# Patient Record
Sex: FEMALE | Race: WHITE | Employment: OTHER | URBAN - METROPOLITAN AREA
[De-identification: names, ages, dates, MRNs, and addresses within clinical notes are randomized per-mention and may not be internally consistent; named-entity substitution may affect disease eponyms.]

---

## 2017-05-27 ENCOUNTER — HOSPITAL ENCOUNTER (OUTPATIENT)
Dept: MAMMOGRAPHY | Age: 67
Discharge: HOME | End: 2017-05-27
Attending: FAMILY MEDICINE | Admitting: FAMILY MEDICINE

## 2017-05-27 DIAGNOSIS — Z12.31 ENCOUNTER FOR SCREENING MAMMOGRAM FOR BREAST CANCER: ICD-10-CM

## 2017-09-29 ENCOUNTER — HOSPITAL ENCOUNTER (OUTPATIENT)
Dept: OTHER | Age: 67
Discharge: HOME | End: 2017-09-29
Attending: FAMILY MEDICINE | Admitting: FAMILY MEDICINE

## 2017-09-29 DIAGNOSIS — M79.672 LEFT FOOT PAIN: ICD-10-CM

## 2017-09-29 DIAGNOSIS — M79.671 RIGHT FOOT PAIN: ICD-10-CM

## 2017-10-06 ENCOUNTER — PROCEDURE VISIT (OUTPATIENT)
Dept: NEUROLOGY | Age: 67
End: 2017-10-06

## 2017-10-06 DIAGNOSIS — R20.2 NUMBNESS AND TINGLING: Primary | ICD-10-CM

## 2017-10-06 DIAGNOSIS — R20.0 NUMBNESS AND TINGLING: Primary | ICD-10-CM

## 2017-10-06 PROCEDURE — 95886 MUSC TEST DONE W/N TEST COMP: CPT | Performed by: PSYCHIATRY & NEUROLOGY

## 2017-10-06 PROCEDURE — 95909 NRV CNDJ TST 5-6 STUDIES: CPT | Performed by: PSYCHIATRY & NEUROLOGY

## 2017-10-06 NOTE — PROGRESS NOTES
Emily Bush M.D. [facility]) Physicians/[facility]  Board Certified in [address] [facility], [address] [facility], [address]    EMG / NERVE CONDUCTION STUDY    PATIENT:     Shashi Mendez      DATE OF EMG:    10/6/2017    YOB: 1950       REASON FOR EMG: Numbness and tingling in both legs       REFERRING PHYSICIAN:  Daryel Snellen, MD  [address], [address]    SUMMARY:  Bilateral peroneal motor nerve studies were normal.  Bilateral posterior tibial motor nerve studies were normal.  Bilateral sural sensory nerve studies were normal.  Needle EMG of several muscles in both lower extremities was normal.  Needle EMG of lumbar paraspinal muscles was normal.     CLINICAL DIAGNOSIS:  Peripheral neuropathy versus lumbar radiculopathy     EMG RESULTS:   This is a normal EMG and nerve conduction study of both lower extremities. There is no electrophysiological evidence for large fiber neuropathy or lumbar radiculopathy at this time. _____________________________  Emily Bush M.D.   Electromyographer/Neurologist

## 2017-10-16 ENCOUNTER — HOSPITAL ENCOUNTER (OUTPATIENT)
Dept: OTHER | Age: 67
Discharge: HOME | End: 2017-10-16
Attending: FAMILY MEDICINE | Admitting: FAMILY MEDICINE

## 2017-10-16 DIAGNOSIS — R20.2 PARESTHESIA: ICD-10-CM

## 2018-05-31 VITALS
WEIGHT: 164 LBS | HEIGHT: 61 IN | SYSTOLIC BLOOD PRESSURE: 130 MMHG | RESPIRATION RATE: 12 BRPM | DIASTOLIC BLOOD PRESSURE: 60 MMHG | BODY MASS INDEX: 30.96 KG/M2 | HEART RATE: 57 BPM | TEMPERATURE: 97.7 F

## 2020-11-23 ENCOUNTER — OFFICE VISIT (OUTPATIENT)
Dept: FAMILY MEDICINE CLINIC | Age: 70
End: 2020-11-23
Payer: MEDICARE

## 2020-11-23 VITALS
OXYGEN SATURATION: 98 % | WEIGHT: 181 LBS | TEMPERATURE: 97.8 F | BODY MASS INDEX: 34.17 KG/M2 | DIASTOLIC BLOOD PRESSURE: 70 MMHG | HEIGHT: 61 IN | HEART RATE: 78 BPM | SYSTOLIC BLOOD PRESSURE: 132 MMHG

## 2020-11-23 LAB
BASOPHILS ABSOLUTE: 0 K/UL (ref 0–0.2)
BASOPHILS RELATIVE PERCENT: 0.6 %
EOSINOPHILS ABSOLUTE: 0.1 K/UL (ref 0–0.6)
EOSINOPHILS RELATIVE PERCENT: 1.5 %
HCT VFR BLD CALC: 41.2 % (ref 36–48)
HEMOGLOBIN: 13.2 G/DL (ref 12–16)
HEPATITIS C ANTIBODY INTERPRETATION: NORMAL
LYMPHOCYTES ABSOLUTE: 2 K/UL (ref 1–5.1)
LYMPHOCYTES RELATIVE PERCENT: 30.7 %
MCH RBC QN AUTO: 26.1 PG (ref 26–34)
MCHC RBC AUTO-ENTMCNC: 31.9 G/DL (ref 31–36)
MCV RBC AUTO: 81.8 FL (ref 80–100)
MONOCYTES ABSOLUTE: 0.5 K/UL (ref 0–1.3)
MONOCYTES RELATIVE PERCENT: 7 %
NEUTROPHILS ABSOLUTE: 4 K/UL (ref 1.7–7.7)
NEUTROPHILS RELATIVE PERCENT: 60.2 %
PDW BLD-RTO: 15.3 % (ref 12.4–15.4)
PLATELET # BLD: 278 K/UL (ref 135–450)
PMV BLD AUTO: 9 FL (ref 5–10.5)
RBC # BLD: 5.04 M/UL (ref 4–5.2)
WBC # BLD: 6.6 K/UL (ref 4–11)

## 2020-11-23 PROCEDURE — 1090F PRES/ABSN URINE INCON ASSESS: CPT | Performed by: FAMILY MEDICINE

## 2020-11-23 PROCEDURE — G8399 PT W/DXA RESULTS DOCUMENT: HCPCS | Performed by: FAMILY MEDICINE

## 2020-11-23 PROCEDURE — 4040F PNEUMOC VAC/ADMIN/RCVD: CPT | Performed by: FAMILY MEDICINE

## 2020-11-23 PROCEDURE — G0008 ADMIN INFLUENZA VIRUS VAC: HCPCS | Performed by: FAMILY MEDICINE

## 2020-11-23 PROCEDURE — 3017F COLORECTAL CA SCREEN DOC REV: CPT | Performed by: FAMILY MEDICINE

## 2020-11-23 PROCEDURE — G8484 FLU IMMUNIZE NO ADMIN: HCPCS | Performed by: FAMILY MEDICINE

## 2020-11-23 PROCEDURE — 99204 OFFICE O/P NEW MOD 45 MIN: CPT | Performed by: FAMILY MEDICINE

## 2020-11-23 PROCEDURE — G8417 CALC BMI ABV UP PARAM F/U: HCPCS | Performed by: FAMILY MEDICINE

## 2020-11-23 PROCEDURE — 1123F ACP DISCUSS/DSCN MKR DOCD: CPT | Performed by: FAMILY MEDICINE

## 2020-11-23 PROCEDURE — G8427 DOCREV CUR MEDS BY ELIG CLIN: HCPCS | Performed by: FAMILY MEDICINE

## 2020-11-23 PROCEDURE — 1036F TOBACCO NON-USER: CPT | Performed by: FAMILY MEDICINE

## 2020-11-23 PROCEDURE — 90694 VACC AIIV4 NO PRSRV 0.5ML IM: CPT | Performed by: FAMILY MEDICINE

## 2020-11-23 ASSESSMENT — PATIENT HEALTH QUESTIONNAIRE - PHQ9
1. LITTLE INTEREST OR PLEASURE IN DOING THINGS: 0
SUM OF ALL RESPONSES TO PHQ QUESTIONS 1-9: 0
SUM OF ALL RESPONSES TO PHQ9 QUESTIONS 1 & 2: 0
2. FEELING DOWN, DEPRESSED OR HOPELESS: 0
SUM OF ALL RESPONSES TO PHQ QUESTIONS 1-9: 0
SUM OF ALL RESPONSES TO PHQ QUESTIONS 1-9: 0

## 2020-11-23 NOTE — PATIENT INSTRUCTIONS
Patient Education        Ankle: Exercises  Introduction  Here are some examples of exercises for you to try. The exercises may be suggested for a condition or for rehabilitation. Start each exercise slowly. Ease off the exercises if you start to have pain. You will be told when to start these exercises and which ones will work best for you. Alphabet exercise  \"Alphabet\" exercise   1. Trace the alphabet with your toe. This helps your ankle move in all directions. Side-to-side knee swing exercise   1. Sit in a chair with your foot flat on the floor. 2. Slowly move your knee from side to side while keeping your foot pressed flat. 3. Continue this exercise for 2 to 3 minutes. Towel curl   1. While sitting, place your foot on a towel on the floor and scrunch the towel toward you with your toes. 2. Then use your toes to push the towel away from you. 3. Make this exercise more challenging by placing a weighted object, such as a soup can, on the other end of the towel. Towel stretch   1. Sit with your legs extended and knees straight. 2. Place a towel around your foot just under the toes. 3. Hold each end of the towel in each hand, with your hands above your knees. 4. Pull back with the towel so that your foot stretches toward you. 5. Hold the position for at least 15 to 30 seconds. 6. Repeat 2 to 4 times a session, up to 5 sessions a day. Ankle eversion exercise   1. Start by sitting with your foot flat on the floor and pushing it outward against an immovable object such as the wall or heavy furniture. Hold for about 6 seconds, then relax. Repeat 8 to 12 times. 2. After you feel comfortable with this, try using rubber tubing looped around the outside of your feet for resistance. Push your foot out to the side against the tubing, and then count to 10 as you slowly bring your foot back to the middle. Repeat 8 to 12 times. Isometric opposition exercises   1.  While sitting, put your feet together flat on the floor. 2. Press your injured foot inward against your other foot. Hold for about 6 seconds, and relax. Repeat 8 to 12 times. 3. Then place the heel of your other foot on top of the injured one. Push down with the top heel while trying to push up with your injured foot. Hold for about 6 seconds, and relax. Repeat 8 to 12 times. Follow-up care is a key part of your treatment and safety. Be sure to make and go to all appointments, and call your doctor if you are having problems. It's also a good idea to know your test results and keep a list of the medicines you take. Where can you learn more? Go to [email or web address] and sign in to your [employer] account. Enter F178 in the [facility] box to learn more about \"Ankle: Exercises. \"     If you do not have an account, please click on the \"Sign Up Now\" link. Current as of: March 2, 2020               Content Version: 12.6  © 2006-2020 [employer]. Care instructions adapted under license by [facility] ([facility]). If you have questions about a medical condition or this instruction, always ask your healthcare professional. [place] [address] any warranty or liability for your use of this information.

## 2020-11-24 LAB
A/G RATIO: 1.4 (ref 1.1–2.2)
ALBUMIN SERPL-MCNC: 4.3 G/DL (ref 3.4–5)
ALP BLD-CCNC: 118 U/L (ref 40–129)
ALT SERPL-CCNC: 11 U/L (ref 10–40)
ANION GAP SERPL CALCULATED.3IONS-SCNC: 11 MMOL/L (ref 3–16)
AST SERPL-CCNC: 16 U/L (ref 15–37)
BILIRUB SERPL-MCNC: 0.3 MG/DL (ref 0–1)
BUN BLDV-MCNC: 17 MG/DL (ref 7–20)
CALCIUM SERPL-MCNC: 9.6 MG/DL (ref 8.3–10.6)
CHLORIDE BLD-SCNC: 104 MMOL/L (ref 99–110)
CHOLESTEROL, TOTAL: 209 MG/DL (ref 0–199)
CO2: 26 MMOL/L (ref 21–32)
CREAT SERPL-MCNC: 0.9 MG/DL (ref 0.6–1.2)
ESTIMATED AVERAGE GLUCOSE: 119.8 MG/DL
GFR AFRICAN AMERICAN: >60
GFR NON-AFRICAN AMERICAN: >60
GLOBULIN: 3 G/DL
GLUCOSE BLD-MCNC: 93 MG/DL (ref 70–99)
HBA1C MFR BLD: 5.8 %
HDLC SERPL-MCNC: 45 MG/DL (ref 40–60)
LDL CHOLESTEROL CALCULATED: 117 MG/DL
POTASSIUM SERPL-SCNC: 4.5 MMOL/L (ref 3.5–5.1)
SODIUM BLD-SCNC: 141 MMOL/L (ref 136–145)
TOTAL PROTEIN: 7.3 G/DL (ref 6.4–8.2)
TRIGL SERPL-MCNC: 236 MG/DL (ref 0–150)
VLDLC SERPL CALC-MCNC: 47 MG/DL

## 2020-11-24 ASSESSMENT — ENCOUNTER SYMPTOMS
ABDOMINAL PAIN: 0
CHEST TIGHTNESS: 0
DIARRHEA: 0
BLOOD IN STOOL: 0
CONSTIPATION: 0
SHORTNESS OF BREATH: 0
COUGH: 0

## 2020-11-24 NOTE — PROGRESS NOTES
SUBJECTIVE:  Jami Peers   1950   female   No Known Allergies    Chief Complaint   Patient presents with    Established New Doctor    Ankle Pain     pain in ankles has had many fractures and sprains over the years         Patient Active Problem List    Diagnosis Date Noted    Basal cell carcinoma of left medial forehead 2016       HPI   New patient here with co GERD, hyperlipidemia, chronic bilateral ankle and left knee pain. Pt reports she has a hx of hyperlipidemia but no prior treatment and chol has not been checked for a while. She has been treating GED sx with OTC medications for over 5 years. Reports heartburn returns off medications. No sx on Omeprazole. Denies any change in diet or appetite . No bowel complaints. She has been having intermittent bilateral ankle pain with walking and sometimes first steps. No recent injuries but previous (mild ) fracture of left ankle and sprains of right ankle. She has also had intermittent pain and stiffness in left knee. No injuries. Denies swelling of knee, locking or giving way.        Past Medical History:   Diagnosis Date    GERD (gastroesophageal reflux disease)     Hyperlipidemia     Obesity      Social History     Socioeconomic History    Marital status: Single     Spouse name: Not on file    Number of children: Not on file    Years of education: Not on file    Highest education level: Not on file   Occupational History    Not on file   Social Needs    Financial resource strain: Not on file    Food insecurity     Worry: Not on file     Inability: Not on file    Transportation needs     Medical: Not on file     Non-medical: Not on file   Tobacco Use    Smoking status: Former Smoker     Packs/day: 1.00     Years: 20.00     Pack years: 20.00     Types: Cigarettes     Start date: 1980     Last attempt to quit: 1997     Years since quittin.9    Smokeless tobacco: Never Used   Substance and Sexual Activity    Alcohol use: No    Drug use: No    Sexual activity: Not on file   Lifestyle    Physical activity     Days per week: Not on file     Minutes per session: Not on file    Stress: Not on file   Relationships    Social connections     Talks on phone: Not on file     Gets together: Not on file     Attends Protestant service: Not on file     Active member of club or organization: Not on file     Attends meetings of clubs or organizations: Not on file     Relationship status: Not on file    Intimate partner violence     Fear of current or ex partner: Not on file     Emotionally abused: Not on file     Physically abused: Not on file     Forced sexual activity: Not on file   Other Topics Concern    Not on file   Social History Narrative    Not on file     Family History   Problem Relation Age of Onset    Breast Cancer Other     Heart Disease Other     Other Other         mi    Alzheimer's Disease Other        Review of Systems   Constitutional: Negative for activity change, appetite change, fever and unexpected weight change. Respiratory: Negative for cough, chest tightness and shortness of breath. Cardiovascular: Negative for chest pain, palpitations and leg swelling. Gastrointestinal: Negative for abdominal pain, blood in stool, constipation and diarrhea. Heartburn without Omeprazole   Musculoskeletal: Positive for arthralgias. Negative for myalgias. Bilateral ankle pain and knee pain /stiffness   Skin: Negative for rash. Neurological: Negative for light-headedness and headaches. Hematological: Negative for adenopathy. Does not bruise/bleed easily. Psychiatric/Behavioral: Negative for dysphoric mood and sleep disturbance. The patient is not nervous/anxious. OBJECTIVE:  /70   Pulse 78   Temp 97.8 °F (36.6 °C)   Ht 5' 1\" (1.549 m)   Wt 181 lb (82.1 kg)   SpO2 98%   BMI 34.20 kg/m²   Physical Exam  Vitals signs and nursing note reviewed.    Constitutional:       Appearance: She is well-developed. Eyes:      Pupils: Pupils are equal, round, and reactive to light. Neck:      Musculoskeletal: Normal range of motion and neck supple. Thyroid: No thyromegaly. Vascular: No JVD. Cardiovascular:      Rate and Rhythm: Normal rate and regular rhythm. Pulmonary:      Effort: Pulmonary effort is normal.      Breath sounds: Normal breath sounds. Abdominal:      General: Bowel sounds are normal.      Palpations: Abdomen is soft. Tenderness: There is no abdominal tenderness. Musculoskeletal:      Comments: Ankles- no swelling or redness. No point tenderness. Nl rom without pain    Left knee- no redness, effusion, nl rom with crepitus   Skin:     Findings: No rash. Neurological:      Mental Status: She is alert and oriented to person, place, and time. Cranial Nerves: No cranial nerve deficit. Psychiatric:         Behavior: Behavior normal.         Thought Content: Thought content normal.         ASSESSMENT/PLAN:    1. Chronic GERD  GERD precautions reviewed  Discussed pot long term risks of PPIs  - AFL - Diomedes Villasenor MD, Gastroenterology, [facility]    2. Hyperlipidemia, unspecified hyperlipidemia type  labs  appr diet mgt discussed  - Comprehensive Metabolic Panel; Future  - CBC Auto Differential; Future  - Hemoglobin A1C; Future  - Lipid Panel; Future  - HEPATITIS C ANTIBODY; Future  - Lipid Panel  - Hemoglobin A1C  - CBC Auto Differential  - Comprehensive Metabolic Panel  - HEPATITIS C ANTIBODY    3. Chronic pain of both ankles  rom stretches  Weight mgt  Voltaren gel  xrays    - XR ANKLE LEFT (2 VIEWS); Future  - XR ANKLE RIGHT (2 VIEWS); Future    4. Chronic pain of left knee  Voltaren gel  Strengthening exercises    5. Need for vaccination    - RI IMMUNIZ ADMIN,1 SINGLE/COMB VAC/TOXOID  - INFLUENZA, QUADV, ADJUVANTED, 65 YRS =, IM, PF, PREFILL SYR, 0.5ML (FLUAD)    6.  Encounter for screening mammogram for malignant neoplasm of breast    - [facility] DIGITAL SCREEN W OR WO CAD BILATERAL; Future    7. Colon cancer screening    - Cologuard (For External Results Only); Future      Orders Placed This Encounter   Procedures    Cologuard (For External Results Only)     This test is performed by an external laboratory and is used for result attachment only. It is required that this order requisition be faxed to: [facility] @ [phone]. See [email or web address] for further information.      Standing Status:   Future     Standing Expiration Date:   11/23/2021    STEFFEN DIGITAL SCREEN W OR WO CAD BILATERAL     Standing Status:   Future     Standing Expiration Date:   1/23/2022    XR ANKLE LEFT (2 VIEWS)     Standing Status:   Future     Standing Expiration Date:   11/23/2021    XR ANKLE RIGHT (2 VIEWS)     Standing Status:   Future     Standing Expiration Date:   11/23/2021    INFLUENZA, QUADV, ADJUVANTED, 72 YRS =, IM, PF, PREFILL SYR, 0.5ML (FLUAD)    Comprehensive Metabolic Panel     Standing Status:   Future     Number of Occurrences:   1     Standing Expiration Date:   12/13/2021    CBC Auto Differential     Standing Status:   Future     Number of Occurrences:   1     Standing Expiration Date:   12/13/2021    Hemoglobin A1C     Standing Status:   Future     Number of Occurrences:   1     Standing Expiration Date:   12/13/2021    Lipid Panel     Standing Status:   Future     Number of Occurrences:   1     Standing Expiration Date:   12/13/2021     Order Specific Question:   Is Patient Fasting?/# of Hours     Answer:   10 hrs    HEPATITIS C ANTIBODY     Standing Status:   Future     Number of Occurrences:   1     Standing Expiration Date:   11/23/2021    DOLLY - Coni Mina MD, Gastroenterology, [facility]     Referral Priority:   Routine     Referral Type:   Eval and Treat     Referral Reason:   Specialty Services Required     Referred to Provider:   Cheri Kmaara MD     Requested Specialty:   Gastroenterology     Number of Visits Requested:   1    MA IMMUNIZ ADMIN,1 SINGLE/COMB VAC/TOXOID     Current Outpatient Medications   Medication Sig Dispense Refill    ibuprofen (ADVIL;MOTRIN) 200 MG tablet Take 800 mg by mouth       Multiple Vitamins-Minerals (MULTIVITAMIN PO) Take 1 capsule by mouth      omeprazole (PRILOSEC) 20 MG capsule TAKE ONE CAPSULE BY MOUTH EVERY DAY       No current facility-administered medications for this visit. Return in about 4 weeks (around 12/21/2020), or if symptoms worsen or fail to improve, for hyperlipidmeia, ankle pain.     Paula Mariee MD

## 2020-12-08 ENCOUNTER — HOSPITAL ENCOUNTER (OUTPATIENT)
Age: 70
Discharge: HOME | End: 2020-12-08
Payer: MEDICARE

## 2020-12-08 ENCOUNTER — HOSPITAL ENCOUNTER (OUTPATIENT)
Dept: GENERAL RADIOLOGY | Age: 70
Discharge: HOME | End: 2020-12-08
Payer: MEDICARE

## 2020-12-08 PROCEDURE — 73610 X-RAY EXAM OF ANKLE: CPT

## 2020-12-17 ENCOUNTER — OFFICE VISIT (OUTPATIENT)
Dept: ORTHOPEDIC SURGERY | Age: 70
End: 2020-12-17
Payer: MEDICARE

## 2020-12-17 VITALS — HEIGHT: 60 IN | WEIGHT: 178 LBS | BODY MASS INDEX: 34.95 KG/M2

## 2020-12-17 PROCEDURE — G8399 PT W/DXA RESULTS DOCUMENT: HCPCS | Performed by: PODIATRIST

## 2020-12-17 PROCEDURE — 1036F TOBACCO NON-USER: CPT | Performed by: PODIATRIST

## 2020-12-17 PROCEDURE — G8484 FLU IMMUNIZE NO ADMIN: HCPCS | Performed by: PODIATRIST

## 2020-12-17 PROCEDURE — 1090F PRES/ABSN URINE INCON ASSESS: CPT | Performed by: PODIATRIST

## 2020-12-17 PROCEDURE — 3017F COLORECTAL CA SCREEN DOC REV: CPT | Performed by: PODIATRIST

## 2020-12-17 PROCEDURE — 4040F PNEUMOC VAC/ADMIN/RCVD: CPT | Performed by: PODIATRIST

## 2020-12-17 PROCEDURE — L1902 AFO ANKLE GAUNTLET PRE OTS: HCPCS | Performed by: PODIATRIST

## 2020-12-17 PROCEDURE — G8417 CALC BMI ABV UP PARAM F/U: HCPCS | Performed by: PODIATRIST

## 2020-12-17 PROCEDURE — 99203 OFFICE O/P NEW LOW 30 MIN: CPT | Performed by: PODIATRIST

## 2020-12-17 PROCEDURE — G8427 DOCREV CUR MEDS BY ELIG CLIN: HCPCS | Performed by: PODIATRIST

## 2020-12-17 PROCEDURE — 1123F ACP DISCUSS/DSCN MKR DOCD: CPT | Performed by: PODIATRIST

## 2020-12-17 RX ORDER — PREDNISONE 10 MG/1
TABLET ORAL
Qty: 26 TABLET | Refills: 0 | Status: SHIPPED | OUTPATIENT
Start: 2020-12-17 | End: 2021-01-07 | Stop reason: ALTCHOICE

## 2020-12-17 NOTE — PROGRESS NOTES
HISTORY OF PRESENT ILLNESS:  This is an intial visit for a 78-year-old female with a chief complaint of pain in the left ankle. She states that this is been bothering her for well over a year however, within the last 6 months it has become much more consistent. The pain is now a daily occurrence. No history of trauma is related. The pain is mainly experienced in the front of the ankle. The pain is worsened with activity and relieved with rest. It is described as mostly a dull achy type pain but can be sharper at times. Approximately 5 years ago, she states that she did sustain a severe ankle sprain of the left ankle. Those symptoms eventually subsided. She also complains of occasional pain to the right ankle but is here mainly because of the left ankle. FAMILY HISTORY:  Documented in chart. SOCIAL HISTORY:  Documented in chart. REVIEW OF SYSTEMS: The patient denies any fever, chills, or night sweats. The patient also denies developing any type of rash. The patient denies any problems with cardiovascular, pulmonary, gastrointestinal, neurologic, urologic, genitourinary, psychiatric, dermatologic, and HEENT systems. Family History, Social History, and Review of Systems were reviewed from patient history form dated on 12/17/2020 and available in the patient's chart under the MEDIA tab. PHYSICAL EXAM: The patient is alert and orientated x3. She currently has minimal palpable tenderness across the anterior aspect of the left ankle. There is very mild edema present over the anterior lateral aspect of the ankle. There is no erythema or ecchymosis present. The patient has palpable pedal pulses bilateral.  The sensation is grossly intact bilateral.      Range of motion at the ankle is decreased in dorsiflexion with the knee extended. There is no pain or crepitus with range of motion to the ankle.     Single limb stance strength of the left is significantly weak however, she also demonstrates weakness of the right side. I would say the left side is weaker than the right. X-RAYS: 3 nonweightbearing views of the left ankle were reviewed. No acute fracture or dislocation is noted. She has mild joint space narrowing of the ankle joint but there are no obvious osteochondral lesions of the talar dome. ASSESSMENT: Ankle Joint Synovitis and Tenosynovitis, left. Chronic lateral ankle instability, left. PLAN:  I educated the patient on the pathology and its treatment options. An Aircast sport ankle brace was applied to the left. They're to use this support at all times with a good supportive athletic shoe. A prednisone 10 mg taper was prescribed for the short-term. I gave her a list of home exercises to strengthen the left and right ankles. I explained to the patient that this is essentially an overuse type injury secondary to the activity level, foot function, and weight. I discussed the recurrent nature of the episodes of pain and chronic nature of this problem. Both short and long term treatment was discussed. I'll see him back in approximately 3 weeks for reevaluation. Procedures    Aircast Air Sport Ankle Brace     Patient was prescribed an Aircast Air Sport Brace. The left ankle will require stabilization / immobilization from this semi-rigid / rigid orthosis to improve their function. The orthosis will assist in protecting the affected area, provide functional support and facilitate healing. Patient was instructed to progress ambulation weight bearing as tolerated in the device. The patient was educated and fit by a healthcare professional with expert knowledge and specialization in brace application while under the direct supervision of the treating physician. Verbal and written instructions for the use of and application of this item were provided.    They were instructed to contact the office immediately should the brace result in increased pain, decreased sensation, increased swelling or worsening of the condition.

## 2020-12-18 ENCOUNTER — TELEPHONE (OUTPATIENT)
Dept: FAMILY MEDICINE CLINIC | Age: 70
End: 2020-12-18

## 2020-12-18 NOTE — TELEPHONE ENCOUNTER
Spoke with [address] [place] [address] @ [phone] and advised that the referral was placed. Gave her the phone number for scheduling. Pt verbalized understanding.

## 2020-12-18 NOTE — TELEPHONE ENCOUNTER
----- Message from Dmitriy Georgia sent at 12/18/2020  2:13 PM EST -----  Subject: Referral Request    QUESTIONS   Reason for referral request? Pt called in regards to a follow up for a   referral for colonoscopy     Has the physician seen you for this condition before? No   Preferred Specialist (if applicable)? Do you already have an appointment scheduled? No  Additional Information for Provider?   ---------------------------------------------------------------------------  --------------  CALL BACK INFO  What is the best way for the office to contact you? OK to leave message on   voicemail  Preferred Call Back Phone Number?  [phone]

## 2020-12-18 NOTE — TELEPHONE ENCOUNTER
Spoke with [employer]. Pt is needing a referral for a GI doctor (Dr. Cheryl Eli. Same group as Dr. Vidal Dukes) per note that she saw on MyChart from [employer]. Please advise?

## 2020-12-21 ENCOUNTER — OFFICE VISIT (OUTPATIENT)
Dept: FAMILY MEDICINE CLINIC | Age: 70
End: 2020-12-21
Payer: MEDICARE

## 2020-12-21 VITALS
OXYGEN SATURATION: 98 % | HEART RATE: 54 BPM | HEIGHT: 60 IN | BODY MASS INDEX: 34.75 KG/M2 | SYSTOLIC BLOOD PRESSURE: 122 MMHG | TEMPERATURE: 97 F | WEIGHT: 177 LBS | DIASTOLIC BLOOD PRESSURE: 80 MMHG | RESPIRATION RATE: 16 BRPM

## 2020-12-21 PROCEDURE — 3017F COLORECTAL CA SCREEN DOC REV: CPT | Performed by: FAMILY MEDICINE

## 2020-12-21 PROCEDURE — G8399 PT W/DXA RESULTS DOCUMENT: HCPCS | Performed by: FAMILY MEDICINE

## 2020-12-21 PROCEDURE — G8484 FLU IMMUNIZE NO ADMIN: HCPCS | Performed by: FAMILY MEDICINE

## 2020-12-21 PROCEDURE — G8427 DOCREV CUR MEDS BY ELIG CLIN: HCPCS | Performed by: FAMILY MEDICINE

## 2020-12-21 PROCEDURE — G0438 PPPS, INITIAL VISIT: HCPCS | Performed by: FAMILY MEDICINE

## 2020-12-21 PROCEDURE — 99214 OFFICE O/P EST MOD 30 MIN: CPT | Performed by: FAMILY MEDICINE

## 2020-12-21 PROCEDURE — G8417 CALC BMI ABV UP PARAM F/U: HCPCS | Performed by: FAMILY MEDICINE

## 2020-12-21 PROCEDURE — 1123F ACP DISCUSS/DSCN MKR DOCD: CPT | Performed by: FAMILY MEDICINE

## 2020-12-21 PROCEDURE — 1036F TOBACCO NON-USER: CPT | Performed by: FAMILY MEDICINE

## 2020-12-21 PROCEDURE — 1090F PRES/ABSN URINE INCON ASSESS: CPT | Performed by: FAMILY MEDICINE

## 2020-12-21 PROCEDURE — 4040F PNEUMOC VAC/ADMIN/RCVD: CPT | Performed by: FAMILY MEDICINE

## 2020-12-21 RX ORDER — PANTOPRAZOLE SODIUM 20 MG/1
20 TABLET, DELAYED RELEASE ORAL DAILY
COMMUNITY
Start: 2020-12-07 | End: 2022-03-11

## 2020-12-21 ASSESSMENT — ENCOUNTER SYMPTOMS
BLOOD IN STOOL: 0
DIARRHEA: 0
ABDOMINAL PAIN: 0
CHEST TIGHTNESS: 0
COUGH: 0
CONSTIPATION: 0
SHORTNESS OF BREATH: 0

## 2020-12-21 ASSESSMENT — PATIENT HEALTH QUESTIONNAIRE - PHQ9
1. LITTLE INTEREST OR PLEASURE IN DOING THINGS: 0
2. FEELING DOWN, DEPRESSED OR HOPELESS: 0
SUM OF ALL RESPONSES TO PHQ QUESTIONS 1-9: 0
DEPRESSION UNABLE TO ASSESS: FUNCTIONAL CAPACITY MOTIVATION LIMITS ACCURACY
SUM OF ALL RESPONSES TO PHQ9 QUESTIONS 1 & 2: 0

## 2020-12-21 ASSESSMENT — LIFESTYLE VARIABLES: HOW OFTEN DO YOU HAVE A DRINK CONTAINING ALCOHOL: 0

## 2020-12-21 NOTE — PATIENT INSTRUCTIONS
Personalized Preventive Plan for Ching Flynn - 12/21/2020  Medicare offers a range of preventive health benefits. Some of the tests and screenings are paid in full while other may be subject to a deductible, co-insurance, and/or copay. Some of these benefits include a comprehensive review of your medical history including lifestyle, illnesses that may run in your family, and various assessments and screenings as appropriate. After reviewing your medical record and screening and assessments performed today your provider may have ordered immunizations, labs, imaging, and/or referrals for you. A list of these orders (if applicable) as well as your Preventive Care list are included within your After Visit Summary for your review. Other Preventive Recommendations:    · A preventive eye exam performed by an eye specialist is recommended every 1-2 years to screen for glaucoma; cataracts, macular degeneration, and other eye disorders. · A preventive dental visit is recommended every 6 months. · Try to get at least 150 minutes of exercise per week or 10,000 steps per day on a pedometer . · Order or download the FREE \"Exercise & Physical Activity: Your Everyday Guide\" from [employer]. Call 3-767.962.8452 or search [employer] online. · You need 2024-3142 mg of calcium and 3902-6356 IU of vitamin D per day. It is possible to meet your calcium requirement with diet alone, but a vitamin D supplement is usually necessary to meet this goal.  · When exposed to the sun, use a sunscreen that protects against both UVA and UVB radiation with an SPF of 30 or greater. Reapply every 2 to 3 hours or after sweating, drying off with a towel, or swimming. · Always wear a seat belt when traveling in a car. Always wear a helmet when riding a bicycle or motorcycle.

## 2020-12-21 NOTE — PROGRESS NOTES
Sexual activity: Not on file   Lifestyle    Physical activity     Days per week: Not on file     Minutes per session: Not on file    Stress: Not on file   Relationships    Social connections     Talks on phone: Not on file     Gets together: Not on file     Attends Scientology service: Not on file     Active member of club or organization: Not on file     Attends meetings of clubs or organizations: Not on file     Relationship status: Not on file    Intimate partner violence     Fear of current or ex partner: Not on file     Emotionally abused: Not on file     Physically abused: Not on file     Forced sexual activity: Not on file   Other Topics Concern    Not on file   Social History Narrative    Not on file     Family History   Problem Relation Age of Onset    Breast Cancer Other     Heart Disease Other     Other Other         mi    Alzheimer's Disease Other        Review of Systems   Constitutional: Negative for activity change, appetite change and unexpected weight change. Respiratory: Negative for cough, chest tightness and shortness of breath. Cardiovascular: Negative for chest pain, palpitations and leg swelling. Gastrointestinal: Negative for abdominal pain, blood in stool, constipation and diarrhea. Musculoskeletal: Negative for arthralgias and myalgias. Skin: Negative for rash. Neurological: Negative for light-headedness and headaches. Hematological: Negative for adenopathy. Does not bruise/bleed easily. Psychiatric/Behavioral: Negative for dysphoric mood, sleep disturbance and suicidal ideas. The patient is not nervous/anxious. OBJECTIVE:  /80 (Site: Right Upper Arm, Position: Sitting, Cuff Size: Medium Adult)   Pulse 54   Temp 97 °F (36.1 °C) (Temporal)   Resp 16   Ht 5' (1.524 m)   Wt 177 lb (80.3 kg)   SpO2 98%   BMI 34.57 kg/m²   Physical Exam  Vitals signs and nursing note reviewed. Constitutional:       Appearance: She is well-developed.    Eyes: Pupils: Pupils are equal, round, and reactive to light. Neck:      Musculoskeletal: Normal range of motion and neck supple. Thyroid: No thyromegaly. Vascular: No JVD. Cardiovascular:      Rate and Rhythm: Normal rate and regular rhythm. Pulmonary:      Effort: Pulmonary effort is normal.      Breath sounds: Normal breath sounds. Abdominal:      General: Bowel sounds are normal.      Palpations: Abdomen is soft. Tenderness: There is no abdominal tenderness. Skin:     Findings: No rash. Neurological:      Mental Status: She is alert and oriented to person, place, and time. Psychiatric:         Behavior: Behavior normal.         Thought Content: Thought content normal.         ASSESSMENT/PLAN:    1. Routine general medical examination at a health care facility  See AWV    2. Hyperlipidemia, unspecified hyperlipidemia type  Reviewed labs  Discussed appr diet / low chol foods  Will continue to follow    3. Chronic GERD  GERD precautions  Continue current mgt and GI fu as planned  Reviewed fu with GI and recommendations and tx change    4. Positive colorectal cancer screening using Cologuard test  Fu with GI for colonoscopy as advised  Discussed pot reasons for positive Cologuard    5. Left ankle pain, unspecified chronicity  Reviewed fu with podiatry and current tx  Weight mgt    6.prediabetes  Reviewed recent labs  Discussed appr diet and weight mgt  Will continue to follow      No orders of the defined types were placed in this encounter.     Current Outpatient Medications   Medication Sig Dispense Refill    pantoprazole (PROTONIX) 20 MG tablet Take 20 mg by mouth daily      predniSONE (DELTASONE) 10 MG tablet 3 po bid x 2 days, then 2 po bid x 2 days, then 1 po bid x 2 days, then 1 po qd x 2 days 26 tablet 0    ibuprofen (ADVIL;MOTRIN) 200 MG tablet Take 800 mg by mouth       Multiple Vitamins-Minerals (MULTIVITAMIN PO) Take 1 capsule by mouth      omeprazole (PRILOSEC) 20 MG capsule TAKE ONE CAPSULE BY MOUTH EVERY DAY       No current facility-administered medications for this visit. Return if symptoms worsen or fail to improve, for Medicare Annual Wellness Visit in 1 year.     Norberto Ruffin MD

## 2020-12-21 NOTE — PROGRESS NOTES
Medicare Annual Wellness Visit  Name: Andressa Conrad Date: 2020   MRN: [id number] Sex: Female   Age: 65 y.o. Ethnicity: Non-/Non    : 1950 Race: Milton Blank is here for Medicare AWV (Follow Up Ankle Pain)    Screenings for behavioral, psychosocial and functional/safety risks, and cognitive dysfunction are all negative except as indicated below. These results, as well as other patient data from the [address] [facility] form, are documented in Flowsheets linked to this Encounter. No Known Allergies    Prior to Visit Medications    Medication Sig Taking?  Authorizing Provider   pantoprazole (PROTONIX) 20 MG tablet Take 20 mg by mouth daily  Historical Provider, MD   predniSONE (DELTASONE) 10 MG tablet 3 po bid x 2 days, then 2 po bid x 2 days, then 1 po bid x 2 days, then 1 po qd x 2 days  Tima Gipson DPM   ibuprofen (ADVIL;MOTRIN) 200 MG tablet Take 800 mg by mouth   Historical Provider, MD   Multiple Vitamins-Minerals (MULTIVITAMIN PO) Take 1 capsule by mouth  Historical Provider, MD   omeprazole (PRILOSEC) 20 MG capsule TAKE ONE CAPSULE BY MOUTH EVERY DAY  Historical Provider, MD       Past Medical History:   Diagnosis Date    GERD (gastroesophageal reflux disease)     Hyperlipidemia     Obesity        Past Surgical History:   Procedure Laterality Date    BREAST BIOPSY Right     MOHS SURGERY  16    Left medial forehead       Family History   Problem Relation Age of Onset    Breast Cancer Other     Heart Disease Other     Other Other         mi    Alzheimer's Disease Other        CareTeam (Including outside providers/suppliers regularly involved in providing care):   Patient Care Team:  Abhilash Thornton MD as PCP - General (Family Medicine)  Abhilash Thornton MD as PCP - [facility] Empaneled Provider    Wt Readings from Last 3 Encounters:   20 177 lb (80.3 kg)   20 178 lb (80.7 kg)   20 181 lb (82.1 kg)     Vitals:    20 1233   BP: 122/80   Site: Right Upper Arm   Position: Sitting   Cuff Size: Medium Adult   Pulse: 54   Resp: 16   Temp: 97 °F (36.1 °C)   TempSrc: Temporal   SpO2: 98%   Weight: 177 lb (80.3 kg)   Height: 5' (1.524 m)     Body mass index is 34.57 kg/m². Based upon direct observation of the patient, evaluation of cognition reveals recent and remote memory intact. See office visit for exam    Patient's complete Health Risk Assessment and screening values have been reviewed and are found in Flowsheets. The following problems were reviewed today and where indicated follow up appointments were made and/or referrals ordered. Positive Risk Factor Screenings with Interventions:     Fall Risk:  2 or more falls in past year?: (!) yes  Fall with injury in past year?: no  Fall Risk Interventions:    · Home safety tips provided     Depression:  Depression Unable to Assess: Functional capacity motivation limits accuracy  PHQ-2 Score: 0  PHQ-9 Total Score: 0    Severity:1-4 = minimal depression, 5-9 = mild depression, 10-14 = moderate depression, 15-19 = moderately severe depression, 20-27 = severe depression      General Health and ACP:  General  In general, how would you say your health is?: Very Good  In the past 7 days, have you experienced any of the following?  New or Increased Pain, New or Increased Fatigue, Loneliness, Social Isolation, Stress or Anger?: None of These  Do you get the social and emotional support that you need?: Yes  Do you have a Living Will?: Yes  Advance Directives     Power of [address] Will ACP-Advance Directive ACP-Power of     Not on File Not on File Not on File Not on File      General Health Risk Interventions:  · no further concerns per pt    Health Habits/Nutrition:  Health Habits/Nutrition  Do you exercise for at least 20 minutes 2-3 times per week?: (!) No  Have you lost any weight without trying in the past 3 months?: No  Do you eat fewer than 2 meals per day?: No  Have you seen a dentist within the past year?: (!) No  Body mass index: (!) 34.56  Health Habits/Nutrition Interventions:  · Inadequate physical activity:  patient is not ready to increase his/her physical activity level at this time  · Dental exam overdue:  patient encouraged to make appointment with his/her dentist    Hearing/Vision:  No exam data present  Hearing/Vision  Do you or your family notice any trouble with your hearing?: (!) Yes  Do you have difficulty driving, watching TV, or doing any of your daily activities because of your eyesight?: No  Have you had an eye exam within the past year?: (!) No  Hearing/Vision Interventions:  · Hearing concerns:  patient declines any further evaluation/treatment for hearing issues, reports she has had her hearing tested in the past and advised she has hearing loss but she is not ready for hearing aids.       Personalized Preventive Plan   Current Health Maintenance Status  Immunization History   Administered Date(s) Administered    Influenza Vaccine, unspecified formulation 10/08/2012    Influenza Virus Vaccine 10/13/2017    Influenza, Quadv, adjuvanted, 65 yrs +, IM, PF (Fluad) 11/23/2020    Pneumococcal Conjugate 13-valent ([id number]) 05/01/2018    Pneumococcal Conjugate Vaccine 07/19/2010    Pneumococcal Polysaccharide ([id number]) 05/23/2017    Td, unspecified formulation 07/19/2010    Tdap (Boostrix, Adacel) 07/19/2010    Zoster Recombinant (Shingrix) 02/17/2016        Health Maintenance   Topic Date Due    Shingles Vaccine (2 of 2) 04/13/2016    Breast cancer screen  05/27/2019    DTaP/Tdap/Td vaccine (2 - Td) 07/19/2020    Annual Wellness Visit (AWV)  11/23/2020    A1C test (Diabetic or Prediabetic)  11/23/2021    Colon cancer screen fecal DNA test (Cologuard)  12/16/2023    Lipid screen  11/23/2025    DEXA (modify frequency per FRAX score)  Completed    Flu vaccine  Completed    Pneumococcal 65+ years Vaccine  Completed    Hepatitis C screen  Completed    Hepatitis A vaccine  Aged Out    Hepatitis B vaccine  Aged Out    Hib vaccine  Aged Out    Meningococcal (ACWY) vaccine  Aged Out     Recommendations for "[employer] Due: see orders and patient instructions/AVS.  . Recommended screening schedule for the next 5-10 years is provided to the patient in written form: see Patient Hari Nash was seen today for medicare aw.     Diagnoses and all orders for this visit:    Routine general medical examination at a health care facility           Shingles #2 advised

## 2020-12-24 ENCOUNTER — HOSPITAL ENCOUNTER (OUTPATIENT)
Dept: WOMENS IMAGING | Age: 70
Discharge: HOME | End: 2020-12-24
Payer: MEDICARE

## 2020-12-24 PROCEDURE — 77067 SCR MAMMO BI INCL CAD: CPT

## 2020-12-29 ENCOUNTER — TELEPHONE (OUTPATIENT)
Dept: FAMILY MEDICINE CLINIC | Age: 70
End: 2020-12-29

## 2020-12-29 DIAGNOSIS — B02.9 HERPES ZOSTER WITHOUT COMPLICATION: Primary | ICD-10-CM

## 2020-12-29 RX ORDER — VALACYCLOVIR HYDROCHLORIDE 1 G/1
1000 TABLET, FILM COATED ORAL 3 TIMES DAILY
Qty: 21 TABLET | Refills: 0 | Status: SHIPPED | OUTPATIENT
Start: 2020-12-29 | End: 2021-01-05

## 2020-12-29 NOTE — TELEPHONE ENCOUNTER
Patient calling stating she went to [facility] on 12/21 for shingles vaccine. This was her second dose. Her chart is showing first dose 02/17/2016. Patient asking if she waited too long for second dose and should get another dose. Patient states on on 12/26 she broke out in shingles on her buttocks closed to her butt crack. Patient states she has had shingles before in this area. Patient said it is annoying but not a big deal.     Please advise at [phone].

## 2021-01-07 ENCOUNTER — OFFICE VISIT (OUTPATIENT)
Dept: ORTHOPEDIC SURGERY | Age: 71
End: 2021-01-07
Payer: MEDICARE

## 2021-01-07 VITALS — BODY MASS INDEX: 33.42 KG/M2 | HEIGHT: 61 IN | TEMPERATURE: 98 F | WEIGHT: 177 LBS

## 2021-01-07 DIAGNOSIS — M25.572 CHRONIC PAIN OF LEFT ANKLE: Primary | ICD-10-CM

## 2021-01-07 DIAGNOSIS — G89.29 CHRONIC PAIN OF LEFT ANKLE: Primary | ICD-10-CM

## 2021-01-07 DIAGNOSIS — M67.372 TRANSIENT SYNOVITIS, LEFT ANKLE AND FOOT: ICD-10-CM

## 2021-01-07 DIAGNOSIS — M25.372 ANKLE INSTABILITY, LEFT: ICD-10-CM

## 2021-01-07 PROCEDURE — 99212 OFFICE O/P EST SF 10 MIN: CPT | Performed by: PODIATRIST

## 2021-01-07 NOTE — PROGRESS NOTES
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 She states that the medication helped resolve 100% of her left ankle pain. Unfortunately she continues having pain over the right ankle. That has been a chronic issue and her well before even her left ankle started hurting. She feels that she can live with the pain at this point. There is minimal edema to the left ankle. She continues having a focal area of swelling right over the ATF ligament. This is more of a lipomatous mass. There is no erythema or ecchymosis present. Her single limb stance strength of the left is still significantly weak. There is no pain with range of motion of the left ankle nor is there crepitus. She has palpable pedal pulses bilateral.  Her sensation is grossly intact bilateral.    Ankle joint synovitis and tenosynovitis, left. Chronic lateral ankle instability, left. I advised her to continue with the ankle brace full-time as well as continue with home exercises. We will see her back as needed.

## 2022-03-07 ENCOUNTER — TELEPHONE (OUTPATIENT)
Dept: FAMILY MEDICINE CLINIC | Age: 72
End: 2022-03-07

## 2022-03-07 NOTE — TELEPHONE ENCOUNTER
I have not seen this patient for over a year and I have not seen her for this particular problem.   She would need to make an appointment to be evaluated

## 2022-03-07 NOTE — TELEPHONE ENCOUNTER
----- Message from Jamel Jack sent at 3/7/2022 10:30 AM EST -----  Subject: Message to Provider    QUESTIONS  Information for Provider? Pt would like to know if Dr Gen Bolton would like to   see her. Pt states left side of left Knee has gotten so bad, it locks up. Pt states it happens quite frequently (multiple times in a day). ---------------------------------------------------------------------------  --------------  Lear How INFO  What is the best way for the office to contact you? OK to leave message on   voicemail  Preferred Call Back Phone Number? [phone]  ---------------------------------------------------------------------------  --------------  SCRIPT ANSWERS  Relationship to Patient?  Self

## 2022-03-11 ENCOUNTER — OFFICE VISIT (OUTPATIENT)
Dept: FAMILY MEDICINE CLINIC | Age: 72
End: 2022-03-11
Payer: MEDICARE

## 2022-03-11 VITALS
OXYGEN SATURATION: 96 % | TEMPERATURE: 97.2 F | WEIGHT: 169.8 LBS | BODY MASS INDEX: 32.08 KG/M2 | DIASTOLIC BLOOD PRESSURE: 74 MMHG | SYSTOLIC BLOOD PRESSURE: 132 MMHG | HEART RATE: 60 BPM

## 2022-03-11 DIAGNOSIS — E78.5 HYPERLIPIDEMIA, UNSPECIFIED HYPERLIPIDEMIA TYPE: ICD-10-CM

## 2022-03-11 DIAGNOSIS — R73.03 PREDIABETES: ICD-10-CM

## 2022-03-11 DIAGNOSIS — M25.561 CHRONIC PAIN OF RIGHT KNEE: Primary | ICD-10-CM

## 2022-03-11 DIAGNOSIS — G89.29 CHRONIC PAIN OF RIGHT KNEE: Primary | ICD-10-CM

## 2022-03-11 PROCEDURE — 99214 OFFICE O/P EST MOD 30 MIN: CPT | Performed by: FAMILY MEDICINE

## 2022-03-11 RX ORDER — METHYLPREDNISOLONE 4 MG/1
TABLET ORAL
Qty: 1 KIT | Refills: 0 | Status: SHIPPED | OUTPATIENT
Start: 2022-03-11 | End: 2022-03-17

## 2022-03-11 ASSESSMENT — PATIENT HEALTH QUESTIONNAIRE - PHQ9
SUM OF ALL RESPONSES TO PHQ QUESTIONS 1-9: 0
SUM OF ALL RESPONSES TO PHQ9 QUESTIONS 1 & 2: 0
2. FEELING DOWN, DEPRESSED OR HOPELESS: 0
1. LITTLE INTEREST OR PLEASURE IN DOING THINGS: 0
SUM OF ALL RESPONSES TO PHQ QUESTIONS 1-9: 0

## 2022-03-11 ASSESSMENT — ENCOUNTER SYMPTOMS
BLOOD IN STOOL: 0
SHORTNESS OF BREATH: 0
CONSTIPATION: 0
DIARRHEA: 0
ABDOMINAL PAIN: 0
COUGH: 0

## 2022-03-11 NOTE — PROGRESS NOTES
SUBJECTIVE:  Kendell Sahu   1950   female   No Known Allergies    Chief Complaint   Patient presents with    Knee Pain     left knee locks in place        Patient Active Problem List    Diagnosis Date Noted    Basal cell carcinoma of left medial forehead 2016       HPI   Patient with history of hyperlipidemia and prediabetes is here today complaining of left knee pain. Patient reports she has had trouble with her left knee for several years or more with intermittent pain and discomfort and knee\" cracking\". In the last 4 to 6 weeks she is also noticed increased pain and knee locking which has been very painful. Reports sometimes when she bends her knee she has a lot of difficulty straightening it back out. No recent injuries. No giving way. No swelling or redness. Patient does have history of hyperlipidemia and prediabetes. Last hemoglobin A1c was a couple years ago and at 5.8. Last cholesterol labs were okay with total cholesterol 219 elevated triglycerides of 236 and LDL of 117. Patient denies chest pain, shortness of breath or myalgias. Denies headache change in vision or neurologic symptoms. No other concerns or questions today.   Past Medical History:   Diagnosis Date    GERD (gastroesophageal reflux disease)     Hyperlipidemia     Obesity      Social History     Socioeconomic History    Marital status: Single     Spouse name: Not on file    Number of children: Not on file    Years of education: Not on file    Highest education level: Not on file   Occupational History    Not on file   Tobacco Use    Smoking status: Former Smoker     Packs/day: 1.00     Years: 20.00     Pack years: 20.00     Types: Cigarettes     Start date: 1980     Quit date: 1997     Years since quittin.2    Smokeless tobacco: Never Used   Vaping Use    Vaping Use: Never used   Substance and Sexual Activity    Alcohol use: No    Drug use: No    Sexual activity: Not on file   Other Topics Concern    Not on file   Social History Narrative    Not on file     Social Determinants of Health     Financial Resource Strain:     Difficulty of Paying Living Expenses: Not on file   Food Insecurity:     Worried About Running Out of Food in the Last Year: Not on file    Deloris of Food in the Last Year: Not on file   Transportation Needs:     Lack of Transportation (Medical): Not on file    Lack of Transportation (Non-Medical): Not on file   Physical Activity:     Days of Exercise per Week: Not on file    Minutes of Exercise per Session: Not on file   Stress:     Feeling of Stress : Not on file   Social Connections:     Frequency of Communication with Friends and Family: Not on file    Frequency of Social Gatherings with Friends and Family: Not on file    Attends Rastafari Services: Not on file    Active Member of [address] [place] [address] or Organizations: Not on file    Attends Club or Organization Meetings: Not on file    Marital Status: Not on file   Intimate Partner Violence:     Fear of Current or Ex-Partner: Not on file    Emotionally Abused: Not on file    Physically Abused: Not on file    Sexually Abused: Not on file   Housing Stability:     Unable to Pay for Housing in the Last Year: Not on file    Number of [place] in the Last Year: Not on file    Unstable Housing in the Last Year: Not on file     Family History   Problem Relation Age of Onset    Breast Cancer Other     Heart Disease Other     Other Other         mi    Alzheimer's Disease Other        Review of Systems   Constitutional: Negative for activity change, appetite change and unexpected weight change. Respiratory: Negative for cough and shortness of breath. Cardiovascular: Negative for chest pain and palpitations. Gastrointestinal: Negative for abdominal pain, blood in stool, constipation and diarrhea. Musculoskeletal: Positive for arthralgias. Negative for myalgias.         Left knee pain and locking   Skin: Negative for rash. Neurological: Negative for light-headedness and headaches. Hematological: Negative for adenopathy. Does not bruise/bleed easily. Psychiatric/Behavioral: Negative for dysphoric mood. OBJECTIVE:  /74   Pulse 60   Temp 97.2 °F (36.2 °C)   Wt 169 lb 12.8 oz (77 kg)   SpO2 96%   BMI 32.08 kg/m²   Physical Exam  Constitutional:       Appearance: She is well-developed. Neck:      Thyroid: No thyromegaly. Vascular: No JVD. Cardiovascular:      Rate and Rhythm: Normal rate and regular rhythm. Pulmonary:      Effort: Pulmonary effort is normal.      Breath sounds: Normal breath sounds. Abdominal:      General: Bowel sounds are normal.      Palpations: Abdomen is soft. Tenderness: There is no abdominal tenderness. Musculoskeletal:      Cervical back: Normal range of motion and neck supple. Comments: Left knee-no swelling or erythema or effusion. There is normal range of motion with some discomfort there is clicking lateral knee. There is also lots of crepitus with range of motion. Skin:     Findings: No rash. Neurological:      Mental Status: She is alert and oriented to person, place, and time. Psychiatric:         Behavior: Behavior normal.         Thought Content: Thought content normal.         ASSESSMENT/PLAN:    1. Chronic pain of right knee  Medrol Dosepak, ice, Ortho  - [facility] - Markos Mendes MD, Orthopedic Surgery, [facility]    2. Hyperlipidemia, unspecified hyperlipidemia type  Patient advised to return shortly for cholesterol recheck    3. Prediabetes  Reviewed last labs.   We will continue to monitor      Orders Placed This Encounter   Procedures   Yola Montilla MD, Orthopedic Surgery, [facility]     Referral Priority:   Routine     Referral Type:   Eval and Treat     Referral Reason:   Specialty Services Required     Requested Specialty:   Orthopedic Surgery     Number of Visits Requested:   1     Current Outpatient Medications   Medication Sig Dispense Refill    methylPREDNISolone (MEDROL DOSEPACK) 4 MG tablet Take by mouth. 1 kit 0    ibuprofen (ADVIL;MOTRIN) 200 MG tablet Take 800 mg by mouth       Multiple Vitamins-Minerals (MULTIVITAMIN PO) Take 1 capsule by mouth      omeprazole (PRILOSEC) 20 MG capsule TAKE ONE CAPSULE BY MOUTH EVERY DAY      pantoprazole (PROTONIX) 20 MG tablet Take 20 mg by mouth daily       No current facility-administered medications for this visit. No follow-ups on file.     Lidya Pina MD, MD

## 2022-03-15 ENCOUNTER — OFFICE VISIT (OUTPATIENT)
Dept: FAMILY MEDICINE CLINIC | Age: 72
End: 2022-03-15
Payer: MEDICARE

## 2022-03-15 VITALS
TEMPERATURE: 97 F | OXYGEN SATURATION: 97 % | HEART RATE: 64 BPM | BODY MASS INDEX: 32.12 KG/M2 | DIASTOLIC BLOOD PRESSURE: 66 MMHG | SYSTOLIC BLOOD PRESSURE: 132 MMHG | WEIGHT: 170 LBS

## 2022-03-15 DIAGNOSIS — G89.29 CHRONIC PAIN OF LEFT KNEE: ICD-10-CM

## 2022-03-15 DIAGNOSIS — Z12.31 ENCOUNTER FOR SCREENING MAMMOGRAM FOR MALIGNANT NEOPLASM OF BREAST: ICD-10-CM

## 2022-03-15 DIAGNOSIS — Z78.0 MENOPAUSE: ICD-10-CM

## 2022-03-15 DIAGNOSIS — M25.562 CHRONIC PAIN OF LEFT KNEE: ICD-10-CM

## 2022-03-15 DIAGNOSIS — R73.03 PREDIABETES: ICD-10-CM

## 2022-03-15 DIAGNOSIS — E78.5 HYPERLIPIDEMIA, UNSPECIFIED HYPERLIPIDEMIA TYPE: Primary | ICD-10-CM

## 2022-03-15 LAB
BILIRUBIN, POC: NEGATIVE
BLOOD URINE, POC: NORMAL
CLARITY, POC: CLEAR
COLOR, POC: NORMAL
GLUCOSE URINE, POC: NEGATIVE
KETONES, POC: NEGATIVE
LEUKOCYTE EST, POC: NORMAL
NITRITE, POC: NEGATIVE
PH, POC: 5.5
PROTEIN, POC: NEGATIVE
SPECIFIC GRAVITY, POC: 1.01
UROBILINOGEN, POC: 0.2

## 2022-03-15 PROCEDURE — 90471 IMMUNIZATION ADMIN: CPT | Performed by: FAMILY MEDICINE

## 2022-03-15 PROCEDURE — 90714 TD VACC NO PRESV 7 YRS+ IM: CPT | Performed by: FAMILY MEDICINE

## 2022-03-15 PROCEDURE — 93000 ELECTROCARDIOGRAM COMPLETE: CPT | Performed by: FAMILY MEDICINE

## 2022-03-15 PROCEDURE — 99214 OFFICE O/P EST MOD 30 MIN: CPT | Performed by: FAMILY MEDICINE

## 2022-03-15 PROCEDURE — 81002 URINALYSIS NONAUTO W/O SCOPE: CPT | Performed by: FAMILY MEDICINE

## 2022-03-16 ASSESSMENT — ENCOUNTER SYMPTOMS
DIARRHEA: 0
CONSTIPATION: 0
COUGH: 0
ABDOMINAL PAIN: 0
SHORTNESS OF BREATH: 0
CHEST TIGHTNESS: 0

## 2022-03-16 NOTE — PROGRESS NOTES
SUBJECTIVE:  Osvaldo Tom   1950   female   No Known Allergies    Chief Complaint   Patient presents with    Annual Exam        Patient Active Problem List    Diagnosis Date Noted    Basal cell carcinoma of left medial forehead 2016       HPI   Patient returns today requesting health physical, follow-up on hyperlipidemia, prediabetes and left knee pain. Patient reports that she is feeling much better with taking prednisone but she still has clicking and sometimes giving way in her knee. She is planning on seeing Ortho and has an appointment coming up shortly. Patient has history of prediabetes and hyperlipidemia. Last cholesterol labs were in  with total cholesterol at 209 and LDL of 117. Last hemoglobin A1c was at 5.8. Patient reports he she has been trying to adhere to a better diet but doing so intermittently. Patient denies chest pain, shortness of breath or myalgias. Denies headache change in vision or neurologic symptoms. No GI or bowel concerns. Last mammogram was in 2020. Last DEXA scan was in . Patient denies symptoms of depression or anxiety. Sleeping well. No other concerns questions today.   Past Medical History:   Diagnosis Date    GERD (gastroesophageal reflux disease)     Hyperlipidemia     Obesity      Social History     Socioeconomic History    Marital status: Single     Spouse name: Not on file    Number of children: Not on file    Years of education: Not on file    Highest education level: Not on file   Occupational History    Not on file   Tobacco Use    Smoking status: Former Smoker     Packs/day: 1.00     Years: 20.00     Pack years: 20.00     Types: Cigarettes     Start date: 1980     Quit date: 1997     Years since quittin.3    Smokeless tobacco: Never Used   Vaping Use    Vaping Use: Never used   Substance and Sexual Activity    Alcohol use: No    Drug use: No    Sexual activity: Not on file   Other Topics Concern  Not on file   Social History Narrative    Not on file     Social Determinants of Health     Financial Resource Strain:     Difficulty of Paying Living Expenses: Not on file   Food Insecurity:     Worried About Running Out of Food in the Last Year: Not on file    Deloris of Food in the Last Year: Not on file   Transportation Needs:     Lack of Transportation (Medical): Not on file    Lack of Transportation (Non-Medical): Not on file   Physical Activity:     Days of Exercise per Week: Not on file    Minutes of Exercise per Session: Not on file   Stress:     Feeling of Stress : Not on file   Social Connections:     Frequency of Communication with Friends and Family: Not on file    Frequency of Social Gatherings with Friends and Family: Not on file    Attends Nondenominational Services: Not on file    Active Member of [address] [place] [address] [employer] or Organizations: Not on file    Attends Club or Organization Meetings: Not on file    Marital Status: Not on file   Intimate Partner Violence:     Fear of Current or Ex-Partner: Not on file    Emotionally Abused: Not on file    Physically Abused: Not on file    Sexually Abused: Not on file   Housing Stability:     Unable to Pay for Housing in the Last Year: Not on file    Number of [place] in the Last Year: Not on file    Unstable Housing in the Last Year: Not on file     Family History   Problem Relation Age of Onset    Breast Cancer Other     Heart Disease Other     Other Other         mi    Alzheimer's Disease Other        Review of Systems   Constitutional: Negative for activity change, appetite change and unexpected weight change. Respiratory: Negative for cough, chest tightness and shortness of breath. Cardiovascular: Negative for chest pain, palpitations and leg swelling. Gastrointestinal: Negative for abdominal pain, constipation and diarrhea. Musculoskeletal: Positive for arthralgias. Negative for myalgias.         Left knee clicking   Skin: Negative for rash.   Neurological: Negative for light-headedness and headaches. Hematological: Negative for adenopathy. Does not bruise/bleed easily. Psychiatric/Behavioral: Negative for dysphoric mood and sleep disturbance. The patient is not nervous/anxious. OBJECTIVE:  /66   Pulse 64   Temp 97 °F (36.1 °C)   Wt 170 lb (77.1 kg)   SpO2 97%   BMI 32.12 kg/m²   Physical Exam  Vitals and nursing note reviewed. Constitutional:       Appearance: She is well-developed. Eyes:      Pupils: Pupils are equal, round, and reactive to light. Neck:      Thyroid: No thyromegaly. Vascular: No JVD. Cardiovascular:      Rate and Rhythm: Normal rate and regular rhythm. Pulmonary:      Effort: Pulmonary effort is normal.      Breath sounds: Normal breath sounds. Abdominal:      General: Bowel sounds are normal.      Palpations: Abdomen is soft. Tenderness: There is no abdominal tenderness. Musculoskeletal:      Cervical back: Normal range of motion and neck supple. Skin:     Findings: No rash. Neurological:      Mental Status: She is alert and oriented to person, place, and time. Psychiatric:         Behavior: Behavior normal.         Thought Content: Thought content normal.         ASSESSMENT/PLAN:    1. Hyperlipidemia, unspecified hyperlipidemia type  Reviewed last cholesterol labs  Discussed appropriate low-cholesterol diet  Recheck labs  - CBC with Auto Differential; Future  - Comprehensive Metabolic Panel; Future  - Lipid Panel; Future    2. Prediabetes  Reviewed last hemoglobin A1c   Encouraged appropriate diet increase physical activity    We will continue to monitor  - Hemoglobin A1C; Future    3. Chronic pain of left knee  Ortho follow-up as advised  Weight management    4. Encounter for screening mammogram for malignant neoplasm of breast    - STEFFEN DIGITAL SCREEN W OR WO CAD BILATERAL; Future    5. Menopause  Daily calcium and vitamin D  - DEXA BONE DENSITY 2 SITES;  Future      Orders Placed This Encounter   Procedures    STEFFEN DIGITAL SCREEN W OR WO CAD BILATERAL     Standing Status:   Future     Standing Expiration Date:   5/15/2023    DEXA BONE DENSITY 2 SITES     Standing Status:   Future     Standing Expiration Date:   3/15/2023    Td (adult), 2 Lf Tetanus Toxoid, PF (Td, absorbed)    CBC with Auto Differential     Standing Status:   Future     Standing Expiration Date:   4/4/2023    Comprehensive Metabolic Panel     Standing Status:   Future     Standing Expiration Date:   4/4/2023    Lipid Panel     Standing Status:   Future     Standing Expiration Date:   4/4/2023     Order Specific Question:   Is Patient Fasting?/# of Hours     Answer:   10 hrs    Hemoglobin A1C     Standing Status:   Future     Standing Expiration Date:   4/4/2023    POCT Urinalysis no Micro    EKG 12 Lead     Order Specific Question:   Reason for Exam?     Answer: Other     Comments:   annual exam     Current Outpatient Medications   Medication Sig Dispense Refill    methylPREDNISolone (MEDROL DOSEPACK) 4 MG tablet Take by mouth. 1 kit 0    ibuprofen (ADVIL;MOTRIN) 200 MG tablet Take 800 mg by mouth       Multiple Vitamins-Minerals (MULTIVITAMIN PO) Take 1 capsule by mouth      omeprazole (PRILOSEC) 20 MG capsule TAKE ONE CAPSULE BY MOUTH EVERY DAY       No current facility-administered medications for this visit. No follow-ups on file.     Osvaldo Alcaraz MD, MD

## 2022-03-17 ENCOUNTER — OFFICE VISIT (OUTPATIENT)
Dept: ORTHOPEDIC SURGERY | Age: 72
End: 2022-03-17
Payer: MEDICARE

## 2022-03-17 VITALS — WEIGHT: 169.97 LBS | HEIGHT: 61 IN | BODY MASS INDEX: 32.09 KG/M2

## 2022-03-17 DIAGNOSIS — M17.12 PRIMARY OSTEOARTHRITIS OF LEFT KNEE: ICD-10-CM

## 2022-03-17 DIAGNOSIS — M23.42 LOOSE BODY IN KNEE, LEFT KNEE: ICD-10-CM

## 2022-03-17 DIAGNOSIS — M25.461 EFFUSION OF RIGHT KNEE: ICD-10-CM

## 2022-03-17 DIAGNOSIS — M25.562 LEFT KNEE PAIN, UNSPECIFIED CHRONICITY: ICD-10-CM

## 2022-03-17 PROCEDURE — 99203 OFFICE O/P NEW LOW 30 MIN: CPT | Performed by: INTERNAL MEDICINE

## 2022-03-17 NOTE — LETTER
[facility] [address] [address]  Phone: [phone]  Fax: [phone]    Geronimo Goldberg MD    March 17, 2022     Raquel Steiner MD  [address] [facility] [address]    Patient: Soniya Fall   MR Number: [id number]   YOB: 1950   Date of Visit: 3/17/2022       Dear Black Zayas: Thank you for referring Leandro Morocho to me for evaluation/treatment. Below are the relevant portions of my assessment and plan of care. Impression: . Encounter Diagnoses   Name Primary?  Primary osteoarthritis of left knee     Loose body in knee, left knee     Effusion of right knee     Left knee pain, unspecified chronicity               Plan:     MRI evaluation left knee evaluate for high-grade chondropathy/chondral flap tear and or loose body as the cause for her mechanical symptoms  Complete the course of steroids as prescribed, elastic compression with ADLs impairing use of functional knee brace  Home exercise knee conditioning program  Consider intra-articular steroid injection only for escalating pain levels      The nature of the finding, probable diagnosis and likely treatment was thoroughly discussed with the patient. The options, risks, complications, alternative treatment as well as some of the differential diagnosis was discussed. The patient was thoroughly informed and all questions were answered. the patient indicated understanding and satisfaction with the discussion.       Orders:        Orders Placed This Encounter   Procedures    XR KNEE LEFT (MIN 4 VIEWS)     Standing Status:   Future     Number of Occurrences:   1     Standing Expiration Date:   3/17/2023     Order Specific Question:   Reason for exam:     Answer:   pain    XR KNEE RIGHT (3 VIEWS)     Standing Status:   Future     Number of Occurrences:   1     Standing Expiration Date:   3/17/2023     Order Specific Question:   Reason for exam:     Answer: pain    MRI KNEE LEFT WO CONTRAST     [facility] [employer], please call pt to schedule, [phone] [address] will obtain auth and fwd to your facility. Pt advised to f/u in clinic 2-3 days after MRI for results. Standing Status:   Future     Standing Expiration Date:   3/17/2023     Order Specific Question:   Reason for exam:     Answer:   r/o OCD, loose body     Order Specific Question:   What is the sedation requirement? Answer:   None           Disclaimer: \"This note was dictated with voice recognition software. Though review and correction are routine, we apologize for any errors. \"           If you have questions, please do not hesitate to call me. I look forward to following [facility] along with you.     Sincerely,      Cyndi Curtis MD

## 2022-03-17 NOTE — PROGRESS NOTES
Chief Complaint:   Chief Complaint   Patient presents with    Knee Pain     left, pain, crepitus and \"locking up\" upon standing, progressively worsening over past 1.5 yrs, NKI          History of Present Illness:       Patient is a 66 y.o. female presents with the above complaint. The patient is seen in consultation at request of Dr. Libra Morales. The symptoms began Several weeks ago  and started without an injury. The patient describes a locking episodes associated with  pain that does not radiate. The symptoms are intermittent  and are are worsening since the onset. Pain localizes to the lateral side of the knee and  does not seems to follow a typical patella femoral provacative pattern. .  The patient denies subjective instability about the knee and denies new onset weakness of the lower extremity. Pain level 6-10/10    The patient denies a pattern of activity related swelling. Treatment to date: Medrol Dosepak. There is no prior history of knee trauma. There is no prior history of autoimmune disease, inflammatory arthropathy, or crystal arthropathy. Past Medical History:        Past Medical History:   Diagnosis Date    GERD (gastroesophageal reflux disease)     Hyperlipidemia     Obesity          Past Surgical History:   Procedure Laterality Date    BREAST BIOPSY Right     MOHS SURGERY  5/18/16    Left medial forehead         Present Medications:         Current Outpatient Medications   Medication Sig Dispense Refill    methylPREDNISolone (MEDROL DOSEPACK) 4 MG tablet Take by mouth. 1 kit 0    ibuprofen (ADVIL;MOTRIN) 200 MG tablet Take 800 mg by mouth       Multiple Vitamins-Minerals (MULTIVITAMIN PO) Take 1 capsule by mouth      omeprazole (PRILOSEC) 20 MG capsule TAKE ONE CAPSULE BY MOUTH EVERY DAY       No current facility-administered medications for this visit.          Allergies:      No Known Allergies     Social History:         Social History Socioeconomic History    Marital status: Single     Spouse name: Not on file    Number of children: Not on file    Years of education: Not on file    Highest education level: Not on file   Occupational History    Not on file   Tobacco Use    Smoking status: Former Smoker     Packs/day: 1.00     Years: 20.00     Pack years: 20.00     Types: Cigarettes     Start date: 1980     Quit date: 1997     Years since quittin.3    Smokeless tobacco: Never Used   Vaping Use    Vaping Use: Never used   Substance and Sexual Activity    Alcohol use: No    Drug use: No    Sexual activity: Not on file   Other Topics Concern    Not on file   Social History Narrative    Not on file     Social Determinants of Health     Financial Resource Strain:     Difficulty of Paying Living Expenses: Not on file   Food Insecurity:     Worried About [employer] [employer] in the Last Year: Not on file    [address] [place] in the Last Year: Not on file   Transportation Needs:     Lack of Transportation (Medical): Not on file    Lack of Transportation (Non-Medical):  Not on file   Physical Activity:     Days of Exercise per Week: Not on file    Minutes of Exercise per Session: Not on file   Stress:     Feeling of Stress : Not on file   Social Connections:     Frequency of Communication with Friends and Family: Not on file    Frequency of Social Gatherings with Friends and Family: Not on file    Attends Gnosticism Services: Not on file    Active Member of Clubs or Organizations: Not on file    Attends Club or Organization Meetings: Not on file    Marital Status: Not on file   Intimate Partner Violence:     Fear of Current or Ex-Partner: Not on file    Emotionally Abused: Not on file    Physically Abused: Not on file    Sexually Abused: Not on file   Housing Stability:     Unable to Pay for Housing in the Last Year: Not on file    Number of [place] in the Last Year: Not on file    Unstable Housing in the Last Year: Not on file        Review of Symptoms:    Pertinent items are noted in HPI    Review of systems reviewed from Patient History Form dated on today's date and   available in the patient's chart under the Media tab. Vital Signs: There were no vitals filed for this visit. General Exam:     Constitutional: Patient is adequately groomed with no evidence of malnutrition  Mental Status: The patient is oriented to time, place and person. The patient's mood and affect are appropriate. Vascular: Examination reveals no swelling or calf tenderness. Peripheral pulses are palpable and 2+. Lymphatics: no lymphadenopathy of the inguinal region or lower extremity      Physical Exam: left knee      Primary Exam:    Inspection: No deformity mild effusion no deformity      Palpation: Focal tenderness of the L JL      Range of Motion: Symmetric and full range with no pain      Strength: Normal with SLR      Special Tests:   Lachman test negative, collateral ligament stressing stable, anterior/posterior drawer negative, lateral Madhu's stressing positive for pain patella femoral provacative Negative      Skin: There are no rashes, ulcerations or lesions. Gait: Nonantalgic      Reflex intact lower     Additional Comments:        Additional Examinations:           Right Lower Extremity: Examination of the right lower extremity does not show any tenderness, deformity or injury. Range of motion is unremarkable. There is no gross instability. There are no rashes, ulcerations or lesions. Strength and tone are normal.   Neurolgic -Light touch sensation and manual muscle testing normal L2-S1. No fasiculations. Pattella tendon and Achilles tendon reflexes +2 bilaterally.   Seated SLR negative        Office Imaging Results/Procedures PerformedToday:          Office Procedures:     Orders Placed This Encounter   Procedures    XR KNEE LEFT (MIN 4 VIEWS)     Standing Status:   Future     Number of Occurrences: 1     Standing Expiration Date:   3/17/2023     Order Specific Question:   Reason for exam:     Answer:   pain    XR KNEE RIGHT (3 VIEWS)     Standing Status:   Future     Number of Occurrences:   1     Standing Expiration Date:   3/17/2023     Order Specific Question:   Reason for exam:     Answer:   pain    MRI KNEE LEFT WO CONTRAST     [facility], please call pt to schedule, [phone]  [facility] will obtain auth and fwd to your facility. Pt advised to f/u in clinic 2-3 days after MRI for results. Standing Status:   Future     Standing Expiration Date:   3/17/2023     Order Specific Question:   Reason for exam:     Answer:   r/o OCD, loose body     Order Specific Question:   What is the sedation requirement? Answer:   None           Other Outside Imaging and Testing Personally Reviewed:    XR KNEE RIGHT (3 VIEWS)    Result Date: 3/17/2022  Radiology exam is complete. No Radiologist dictation. Please follow up with ordering provider. XR KNEE LEFT (MIN 4 VIEWS)    Result Date: 3/17/2022  Radiology exam is complete. No Radiologist dictation. Please follow up with ordering provider. Assessment   Impression: . Encounter Diagnoses   Name Primary?  Primary osteoarthritis of left knee     Loose body in knee, left knee     Effusion of right knee     Left knee pain, unspecified chronicity               Plan:     MRI evaluation left knee evaluate for high-grade chondropathy/chondral flap tear and or loose body as the cause for her mechanical symptoms  Complete the course of steroids as prescribed, elastic compression with ADLs impairing use of functional knee brace  Home exercise knee conditioning program  Consider intra-articular steroid injection only for escalating pain levels      The nature of the finding, probable diagnosis and likely treatment was thoroughly discussed with the patient.  The options, risks, complications, alternative treatment as well as some of the differential diagnosis was discussed. The patient was thoroughly informed and all questions were answered. the patient indicated understanding and satisfaction with the discussion. Orders:        Orders Placed This Encounter   Procedures    XR KNEE LEFT (MIN 4 VIEWS)     Standing Status:   Future     Number of Occurrences:   1     Standing Expiration Date:   3/17/2023     Order Specific Question:   Reason for exam:     Answer:   pain    XR KNEE RIGHT (3 VIEWS)     Standing Status:   Future     Number of Occurrences:   1     Standing Expiration Date:   3/17/2023     Order Specific Question:   Reason for exam:     Answer:   pain    MRI KNEE LEFT WO CONTRAST     [facility], please call pt to schedule, [phone]  [facility] will obtain auth and fwd to your facility. Pt advised to f/u in clinic 2-3 days after MRI for results. Standing Status:   Future     Standing Expiration Date:   3/17/2023     Order Specific Question:   Reason for exam:     Answer:   r/o OCD, loose body     Order Specific Question:   What is the sedation requirement? Answer:   None           Disclaimer: \"This note was dictated with voice recognition software. Though review and correction are routine, we apologize for any errors. \"

## 2022-03-30 ENCOUNTER — HOSPITAL ENCOUNTER (OUTPATIENT)
Dept: MRI IMAGING | Age: 72
Discharge: HOME | End: 2022-03-30
Payer: MEDICARE

## 2022-03-30 DIAGNOSIS — M25.562 LEFT KNEE PAIN, UNSPECIFIED CHRONICITY: ICD-10-CM

## 2022-03-30 PROCEDURE — 73721 MRI JNT OF LWR EXTRE W/O DYE: CPT

## 2022-04-05 ENCOUNTER — OFFICE VISIT (OUTPATIENT)
Dept: ORTHOPEDIC SURGERY | Age: 72
End: 2022-04-05
Payer: MEDICARE

## 2022-04-05 VITALS — HEIGHT: 61 IN | BODY MASS INDEX: 32.09 KG/M2 | WEIGHT: 169.97 LBS

## 2022-04-05 DIAGNOSIS — M17.12 PRIMARY OSTEOARTHRITIS OF LEFT KNEE: Primary | ICD-10-CM

## 2022-04-05 DIAGNOSIS — M23.301 DEGENERATIVE TEAR OF LATERAL MENISCUS OF LEFT KNEE: ICD-10-CM

## 2022-04-05 DIAGNOSIS — M21.162 ACQUIRED VARUS DEFORMITY KNEE, LEFT: ICD-10-CM

## 2022-04-05 PROCEDURE — 99214 OFFICE O/P EST MOD 30 MIN: CPT | Performed by: INTERNAL MEDICINE

## 2022-04-05 RX ORDER — MELOXICAM 15 MG/1
15 TABLET ORAL DAILY
Qty: 30 TABLET | Refills: 2 | Status: SHIPPED | OUTPATIENT
Start: 2022-04-05

## 2022-04-05 NOTE — PROGRESS NOTES
Chief Complaint:   Chief Complaint   Patient presents with    Knee Pain     left, pain has worsened since MRI, NKI, B calves cramping and increased L knee pain, steroids helped with post knee pain, but not lateral PF joint pain          History of Present Illness:       Patient is a 66 y.o. female returns follow up for the above complaint. The patient was last seen approximately 2 weeksago. The symptoms show no change since the last visit. The patient has had further testing for the problem. No appreciable benefit from the trial of Medrol    In the interim MRI scan completed which is outlined below    Overall symptoms of pain have become more pervasive localized to the lateral joint line region    She denies any predictable mechanical symptoms    No pattern of active related swelling of significance    She like to consider other treatment options    Pain levels 2-6/10         Past Medical History:        Past Medical History:   Diagnosis Date    GERD (gastroesophageal reflux disease)     Hyperlipidemia     Obesity         Present Medications:         Current Outpatient Medications   Medication Sig Dispense Refill    meloxicam (MOBIC) 15 MG tablet Take 1 tablet by mouth daily 30 tablet 2    ibuprofen (ADVIL;MOTRIN) 200 MG tablet Take 800 mg by mouth       Multiple Vitamins-Minerals (MULTIVITAMIN PO) Take 1 capsule by mouth      omeprazole (PRILOSEC) 20 MG capsule TAKE ONE CAPSULE BY MOUTH EVERY DAY       No current facility-administered medications for this visit. Allergies:      No Known Allergies        Review of Systems:    Pertinent items are noted in HPI     Vital Signs: There were no vitals filed for this visit.      General Exam:     Constitutional: Patient is adequately groomed with no evidence of malnutrition    Physical Exam: left knee      Primary Exam:    Inspection: Trace effusion      Palpation: Mild tenderness of the L JL      Range of Motion: Stable unchanged from retinaculum remain intact. LATERAL COLLATERAL LIGAMENT COMPLEX: The popliteus tendon, biceps femoris tendon, fibular collateral ligament and iliotibial band are intact. MEDIAL COLLATERAL LIGAMENT COMPLEX: The superficial and deep components of the medial collateral ligament are intact. KNEE JOINT: Anatomic alignment of the knee. Moderate knee effusion and mild synovitis. Diffuse grade 4 chondromalacia of the lateral compartment of the knee. Grade 2 and 3 chondromalacia of the medial patellofemoral compartments. Moderate sized popliteal cyst. BONE MARROW: Degenerate marrow signal changes of the lateral compartment of the knee. Marginal osteophytes within all 3 compartments of the knee. No suspicious marrow occupying lesion. No fracture or osseous contusion. 1. Complex tearing of the lateral meniscus diffusely. 2. Tendinosis of the intact extensor mechanism. 3. Tricompartmental osteoarthritis of the left knee which is moderate to severe laterally and mild within the medial patellofemoral compartments. Underlying diffuse grade 4 chondromalacia of the lateral compartment of the knee. 4. Moderate knee effusion and synovitis. 5. Moderate-sized popliteal cyst.              Assessment   Impression: . Encounter Diagnoses   Name Primary?     Primary osteoarthritis of left knee Yes    Degenerative tear of lateral meniscus of left knee     Acquired varus deformity knee, left               Plan:   Hyaluronic acid therapy preauthorization  Functional knee brace with increased activity levels) with ADLs  Meloxicam daily with GI precaution  Recommend formal course of PT knee conditioning program  Consider OA  bracing, premature to consider a candidate for TKA           Orders:        Orders Placed This Encounter   Procedures   [address] [place] [address] (Ortho & Sports)-[facility]     Referral Priority:   Routine     Referral Type:   Eval and Treat     Referral Reason:   Specialty Services Required Requested Specialty:   Physical Therapy     Number of Visits Requested:   1    [facility] (For Auth/Precert)     Standing Status:   Future     Standing Expiration Date:   4/5/2023         Wander Jones MD.      Disclaimer: \"This note was dictated with voice recognition software. Though review and correction are routine, we apologize for any errors. \"

## 2022-04-08 ENCOUNTER — HOSPITAL ENCOUNTER (OUTPATIENT)
Dept: MAMMOGRAPHY | Age: 72
Discharge: HOME | End: 2022-04-08
Payer: MEDICARE

## 2022-04-08 ENCOUNTER — HOSPITAL ENCOUNTER (OUTPATIENT)
Dept: GENERAL RADIOLOGY | Age: 72
Discharge: HOME | End: 2022-04-08
Payer: MEDICARE

## 2022-04-08 VITALS — HEIGHT: 60 IN | BODY MASS INDEX: 32.59 KG/M2 | WEIGHT: 166 LBS

## 2022-04-08 DIAGNOSIS — Z12.31 ENCOUNTER FOR SCREENING MAMMOGRAM FOR MALIGNANT NEOPLASM OF BREAST: ICD-10-CM

## 2022-04-08 DIAGNOSIS — E78.5 HYPERLIPIDEMIA, UNSPECIFIED HYPERLIPIDEMIA TYPE: ICD-10-CM

## 2022-04-08 DIAGNOSIS — R73.03 PREDIABETES: ICD-10-CM

## 2022-04-08 DIAGNOSIS — Z78.0 MENOPAUSE: ICD-10-CM

## 2022-04-08 LAB
A/G RATIO: 2.1 (ref 1.1–2.2)
ALBUMIN SERPL-MCNC: 3.9 G/DL (ref 3.4–5)
ALP BLD-CCNC: 97 U/L (ref 40–129)
ALT SERPL-CCNC: 9 U/L (ref 10–40)
ANION GAP SERPL CALCULATED.3IONS-SCNC: 12 MMOL/L (ref 3–16)
AST SERPL-CCNC: 12 U/L (ref 15–37)
BASOPHILS ABSOLUTE: 0 K/UL (ref 0–0.2)
BASOPHILS RELATIVE PERCENT: 0.6 %
BILIRUB SERPL-MCNC: 0.4 MG/DL (ref 0–1)
BUN BLDV-MCNC: 14 MG/DL (ref 7–20)
CALCIUM SERPL-MCNC: 8.9 MG/DL (ref 8.3–10.6)
CHLORIDE BLD-SCNC: 107 MMOL/L (ref 99–110)
CHOLESTEROL, TOTAL: 170 MG/DL (ref 0–199)
CO2: 25 MMOL/L (ref 21–32)
CREAT SERPL-MCNC: 0.9 MG/DL (ref 0.6–1.2)
EOSINOPHILS ABSOLUTE: 0.1 K/UL (ref 0–0.6)
EOSINOPHILS RELATIVE PERCENT: 2 %
GFR AFRICAN AMERICAN: >60
GFR NON-AFRICAN AMERICAN: >60
GLUCOSE BLD-MCNC: 82 MG/DL (ref 70–99)
HCT VFR BLD CALC: 38.3 % (ref 36–48)
HDLC SERPL-MCNC: 45 MG/DL (ref 40–60)
HEMOGLOBIN: 12.2 G/DL (ref 12–16)
LDL CHOLESTEROL CALCULATED: 97 MG/DL
LYMPHOCYTES ABSOLUTE: 2 K/UL (ref 1–5.1)
LYMPHOCYTES RELATIVE PERCENT: 41.6 %
MCH RBC QN AUTO: 26.2 PG (ref 26–34)
MCHC RBC AUTO-ENTMCNC: 31.8 G/DL (ref 31–36)
MCV RBC AUTO: 82.3 FL (ref 80–100)
MONOCYTES ABSOLUTE: 0.4 K/UL (ref 0–1.3)
MONOCYTES RELATIVE PERCENT: 7.4 %
NEUTROPHILS ABSOLUTE: 2.4 K/UL (ref 1.7–7.7)
NEUTROPHILS RELATIVE PERCENT: 48.4 %
PDW BLD-RTO: 15.8 % (ref 12.4–15.4)
PLATELET # BLD: 270 K/UL (ref 135–450)
PMV BLD AUTO: 8.7 FL (ref 5–10.5)
POTASSIUM SERPL-SCNC: 4 MMOL/L (ref 3.5–5.1)
RBC # BLD: 4.66 M/UL (ref 4–5.2)
SODIUM BLD-SCNC: 144 MMOL/L (ref 136–145)
TOTAL PROTEIN: 5.8 G/DL (ref 6.4–8.2)
TRIGL SERPL-MCNC: 142 MG/DL (ref 0–150)
VLDLC SERPL CALC-MCNC: 28 MG/DL
WBC # BLD: 4.9 K/UL (ref 4–11)

## 2022-04-08 PROCEDURE — 77080 DXA BONE DENSITY AXIAL: CPT

## 2022-04-08 PROCEDURE — 77067 SCR MAMMO BI INCL CAD: CPT

## 2022-04-09 LAB
ESTIMATED AVERAGE GLUCOSE: 122.6 MG/DL
HBA1C MFR BLD: 5.9 %

## 2022-04-20 ENCOUNTER — TELEPHONE (OUTPATIENT)
Dept: ORTHOPEDIC SURGERY | Age: 72
End: 2022-04-20

## 2022-04-20 NOTE — TELEPHONE ENCOUNTER
Appeal submitted to insurance for reconsideration of denial with steroid letter (scanned into Media) attached. Will notify pt of result.

## 2022-04-20 NOTE — TELEPHONE ENCOUNTER
General Question     Subject: PATIENT IS RETURNING CALL. SHE HAS NEVER HAD A CORTISONE IN HER LEFT KNEE. SHE DID HAVE ONE IN HER LEFT FOOT AND IT NEVER DID ANY GOOD.     Patient:  Magali Isis \"Audrey\"  Contact Number: [phone]

## 2022-04-20 NOTE — TELEPHONE ENCOUNTER
LVM for pt that gel injections denied d/t needing to try and fail cortisone injection first.  Need to know if pt has ever had cortisone injections in the past, and if so, how many and what was her response to them? If not, we would need to know this as well. Denial may be appealed, once we have this information.

## 2022-04-28 ENCOUNTER — HOSPITAL ENCOUNTER (OUTPATIENT)
Dept: PHYSICAL THERAPY | Age: 72
Setting detail: THERAPIES SERIES
Discharge: HOME | End: 2022-04-28

## 2022-05-03 ENCOUNTER — TELEPHONE (OUTPATIENT)
Dept: ORTHOPEDIC SURGERY | Age: 72
End: 2022-05-03

## 2022-05-03 NOTE — TELEPHONE ENCOUNTER
General Question     Subject: Patient call and would like to know if she can get her Durolane injection schedule for May 10,2022 if possible she was just needing some clarity. Please Advise.    Andrea Jacobo \"Audrey\"  Contact Number:[phone]

## 2022-05-06 NOTE — TELEPHONE ENCOUNTER
Pt states she has received approval for the Euflexxa and will drop off the approval paperwork. She is sched for the series of 3 Euflexxa injections.

## 2022-05-10 ENCOUNTER — OFFICE VISIT (OUTPATIENT)
Dept: FAMILY MEDICINE CLINIC | Age: 72
End: 2022-05-10
Payer: MEDICARE

## 2022-05-10 ENCOUNTER — OFFICE VISIT (OUTPATIENT)
Dept: ORTHOPEDIC SURGERY | Age: 72
End: 2022-05-10
Payer: MEDICARE

## 2022-05-10 VITALS
HEART RATE: 60 BPM | DIASTOLIC BLOOD PRESSURE: 60 MMHG | SYSTOLIC BLOOD PRESSURE: 124 MMHG | TEMPERATURE: 97.1 F | WEIGHT: 169 LBS | OXYGEN SATURATION: 97 % | BODY MASS INDEX: 31.95 KG/M2

## 2022-05-10 VITALS — HEIGHT: 61 IN | BODY MASS INDEX: 32.09 KG/M2 | WEIGHT: 169.97 LBS

## 2022-05-10 DIAGNOSIS — M21.162 ACQUIRED VARUS DEFORMITY KNEE, LEFT: ICD-10-CM

## 2022-05-10 DIAGNOSIS — M81.0 OSTEOPOROSIS, UNSPECIFIED OSTEOPOROSIS TYPE, UNSPECIFIED PATHOLOGICAL FRACTURE PRESENCE: Primary | ICD-10-CM

## 2022-05-10 DIAGNOSIS — M17.12 PRIMARY OSTEOARTHRITIS OF LEFT KNEE: ICD-10-CM

## 2022-05-10 DIAGNOSIS — E78.5 HYPERLIPIDEMIA, UNSPECIFIED HYPERLIPIDEMIA TYPE: ICD-10-CM

## 2022-05-10 DIAGNOSIS — R73.03 PREDIABETES: ICD-10-CM

## 2022-05-10 DIAGNOSIS — M23.301 DEGENERATIVE TEAR OF LATERAL MENISCUS OF LEFT KNEE: ICD-10-CM

## 2022-05-10 PROCEDURE — 99214 OFFICE O/P EST MOD 30 MIN: CPT | Performed by: FAMILY MEDICINE

## 2022-05-10 PROCEDURE — 20611 DRAIN/INJ JOINT/BURSA W/US: CPT | Performed by: INTERNAL MEDICINE

## 2022-05-10 RX ORDER — ALENDRONATE SODIUM 70 MG/1
70 TABLET ORAL
Qty: 4 TABLET | Refills: 2 | Status: SHIPPED | OUTPATIENT
Start: 2022-05-10 | End: 2022-08-09 | Stop reason: SDUPTHER

## 2022-05-10 RX ORDER — HYALURONATE SODIUM 10 MG/ML
20 SYRINGE (ML) INTRAARTICULAR ONCE
Status: COMPLETED | OUTPATIENT
Start: 2022-05-10 | End: 2022-05-10

## 2022-05-10 RX ADMIN — Medication 20 MG: at 10:12

## 2022-05-10 ASSESSMENT — PATIENT HEALTH QUESTIONNAIRE - PHQ9
SUM OF ALL RESPONSES TO PHQ QUESTIONS 1-9: 0
SUM OF ALL RESPONSES TO PHQ QUESTIONS 1-9: 0
1. LITTLE INTEREST OR PLEASURE IN DOING THINGS: 0
SUM OF ALL RESPONSES TO PHQ QUESTIONS 1-9: 0
2. FEELING DOWN, DEPRESSED OR HOPELESS: 0
SUM OF ALL RESPONSES TO PHQ QUESTIONS 1-9: 0
SUM OF ALL RESPONSES TO PHQ9 QUESTIONS 1 & 2: 0

## 2022-05-10 ASSESSMENT — ENCOUNTER SYMPTOMS
CHEST TIGHTNESS: 0
DIARRHEA: 0
CONSTIPATION: 0
COUGH: 0
SHORTNESS OF BREATH: 0
ABDOMINAL PAIN: 0

## 2022-05-10 NOTE — PATIENT INSTRUCTIONS
Patient Education        Osteoporosis: Care Instructions  Overview     Osteoporosis causes bones to become thin and weak. It is much more common in women than in men. Your chances of getting this disease depend on several things. These factors include the thickness of your bones (bone density), aswell as health, diet, and physical activity. This disease may be very advanced before you know you have it. Sometimes the first sign is a broken bone in the hip, spine, or wrist. Or you may have suddenpain in your middle or lower back. Follow-up care is a key part of your treatment and safety. Be sure to make and go to all appointments, and call your doctor if you are having problems. It's also a good idea to know your test results and keep alist of the medicines you take. How can you care for yourself at home?  Your doctor may prescribe a bisphosphonate, such as risedronate (Actonel) or alendronate (Fosamax), for osteoporosis. If you are taking one of these medicines by mouth:  ? Take your medicine with a full glass of water when you first get up in the morning. ? Do not lie down, eat, drink a beverage, or take any other medicine for at least 30 minutes after taking the drug. This helps prevent stomach problems. ? Do not take your medicine late in the day if you forgot to take it in the morning. Skip it, and take the usual dose the next morning. ? If you have side effects, tell your doctor. Your doctor may prescribe another medicine.  Get enough calcium and vitamin D. The recommended dietary allowances (RDAs) for adults younger than age 46 are 1,000 mg of calcium and 600 IU of vitamin D each day. Women ages 46 to 79 need 1,200 mg of calcium and 600 IU of vitamin D each day. Men ages 46 to 79 need 1,000 mg of calcium and 600 IU of vitamin D each day. Adults 71 and older need 1,200 mg of calcium and 800 IU of vitamin D each day.  It's not clear if people who already have osteoporosis need more calcium and vitamin D than this. Talk to your doctor about what's right for you.  ? Eat foods rich in calcium, like yogurt, cheese, milk, and dark green vegetables. This is a good way to get the calcium you need. You can get vitamin D from eggs, fatty fish, cereal, and milk. ? Ask your doctor if you need to take a calcium plus vitamin D supplement. You may be able to get enough calcium and vitamin D through your diet. Be careful with supplements. Adults ages 23 to 48 should not get more than 2,500 mg of calcium and 4,000 IU of vitamin D each day, whether it is from supplements and/or food. Adults ages 46 and older should not get more than 2,000 mg of calcium and 4,000 IU of vitamin D each day from supplements and/or food.  Limit alcohol to 2 drinks a day for men and 1 drink a day for women. Too much alcohol can cause health problems.  Do not smoke. Smoking puts you at a much higher risk for osteoporosis. If you need help quitting, talk to your doctor about stop-smoking programs and medicines. These can increase your chances of quitting for good.  Get regular bone-building exercise. Weight-bearing and resistance exercises keep bones healthy by working the muscles and bones against gravity. Start out at an exercise level that feels right for you. Add a little at a time until you can do the following:  ? Do 30 minutes of weight-bearing exercise on most days of the week. Walking, jogging, stair climbing, and dancing are good choices. ? Do resistance exercises with weights or elastic bands 2 to 3 days a week.  Reduce your risk of falls:  ? Wear supportive shoes with low heels and nonslip soles. ? Use a cane or walker, if you need it. Use shower chairs and bath benches. Put in handrails on stairways, around your shower or tub area, and near the toilet. ? Keep stairs, porches, and walkways well lit. Use night-lights. ? Remove throw rugs and other objects that are in the way. ? Avoid icy, wet, or slippery surfaces.   When should you call for help? Watch closely for changes in your health, and be sure to contact your doctor if you have any problems. Where can you learn more? Go to https://chpepiceweb.Jaree. org and sign in to your [employer] account. Enter K100 in the [employer] box to learn more about \"Osteoporosis: Care Instructions. \"     If you do not have an account, please click on the \"Sign Up Now\" link. Current as of: September 8, 2021               Content Version: 13.2  © 6873-2122 [employer]. Care instructions adapted under license by [facility] ([facility]). If you have questions about a medical condition or this instruction, always ask your healthcare professional. [address] any warranty or liability for your use of this information. Patient Education        Deciding About Bisphosphonate Medicine for Osteoporosis  How can you decide about taking bisphosphonate medicine for osteoporosis? This information is right for you if you are a woman who has been through menopause. If that does not describe you, or if you're not sure, talk with yourdoctor about this decision. What is osteoporosis? Osteoporosis is a disease that affects your bones. It means you have bones that are thin and brittle and have lots of holes inside them like a sponge. This makes them easy to break. It can lead to broken bones (fractures), especially in the hip, spine, and wrist. These fractures may make it hard for you to liveon your own. Bisphosphonates are the most common medicines used to prevent bone loss. They may be taken as a pill or an injection into a vein. Bisphosphonates slow the way bone dissolves and is absorbed by your body. They can increase bonethickness and strength. What are key points about this decision?  If you are at a higher risk of having a fracture, taking bisphosphonates is more likely to help you prevent a fracture.  If your risk of a fracture is lower, it's less likely that these medicines will help you.  Bisphosphonates can cause problems with the jaw or thigh bone. But most women do not have these side effects.  Whether you take medicine or not, healthy habits can help protect your bones. Get enough calcium and vitamin D. Get regular weight-bearing exercise. Cut back on alcohol. And if you smoke, quit. Who is helped the most by bisphosphonates? For women who have been through menopause:   If you have osteoporosis (your T-score is -2.5 or less) or you have had a fracture, taking bisphosphonates lowers your risk of having a fracture.  If you haven't had a fracture and you have low bone density (your T-score is between -1.0 and -2.5, sometimes called osteopenia), taking bisphosphonates might lower your risk of having a fracture. This evidence is not as strong. What are the side effects of bisphosphonates? These medicines can have side effects, such as heartburn and belly pain. Certain bone problems have also been reported in women taking bisphosphonates. Out of 1,000 people, about 1 person has a bone side effect during a year of taking bisphosphonates. That means 999 out of 1,000 people do not have a boneside effect. These bone side effects include problems with the jaw bone (called osteonecrosis). They also might include a certain kind of fracture of the thigh bone (called an atypical fracture), but more research is needed to find out iftaking bisphosphonates is a cause of these fractures. Your decision  Thinking about the facts and your feelings can help you make a decision that is right for you. Be sure you understand the benefits and risks of your options,and think about what else you need to do before you make the decision. Where can you learn more? Go to https://xavier.[employer] Works. org and sign in to your [employer] account. Enter C774 in the [employer] box to learn more about \"Deciding About Bisphosphonate Medicine for Osteoporosis. \"     If you do not have an account, please click on the \"Sign Up Now\" link. Current as of: September 8, 2021               Content Version: 13.2  © 2006-2022 [employer]. Care instructions adapted under license by [facility] ([facility]). If you have questions about a medical condition or this instruction, always ask your healthcare professional. [address] any warranty or liability for your use of this information.

## 2022-05-10 NOTE — PROGRESS NOTES
Chief Complaint:   Chief Complaint   Patient presents with    Knee Pain     L knee HA injection euflexxa #1, worsening pain along lateral jt line          History of Present Illness:       Patient is a 66 y.o. female returns follow up for the above complaint. The patient was last seen approximately 1 monthsago. The symptoms show no change since the last visit. The patient has had no further testing for the problem. In the interim HA was approved by her insurerer after initial denial.    Pain localized to the lateral compartment region of the knee    There is noted swelling of significance    She has yet to start physical therapy    She is not inclined to use her functional knee brace and is not interested t entertaining other bracing options    She denies mechanical symptoms at this time. She denies any constitutional symptoms     Past Medical History:        Past Medical History:   Diagnosis Date    GERD (gastroesophageal reflux disease)     Hyperlipidemia     Obesity         Present Medications:         Current Outpatient Medications   Medication Sig Dispense Refill    meloxicam (MOBIC) 15 MG tablet Take 1 tablet by mouth daily 30 tablet 2    ibuprofen (ADVIL;MOTRIN) 200 MG tablet Take 800 mg by mouth       Multiple Vitamins-Minerals (MULTIVITAMIN PO) Take 1 capsule by mouth      omeprazole (PRILOSEC) 20 MG capsule TAKE ONE CAPSULE BY MOUTH EVERY DAY       No current facility-administered medications for this visit. Allergies:      No Known Allergies        Review of Systems:    Pertinent items are noted in HPI      Vital Signs: There were no vitals filed for this visit.      General Exam:     Constitutional: Patient is adequately groomed with no evidence of malnutrition    Physical Exam: right knee      Primary Exam:    Inspection: No appreciable effusion      Range of Motion: Stable unchanged from previous      Strength: Normal with SLR      Skin: There are no rashes, ulcerations or lesions. Gait: Intact lower    Neurovascular - non focal and intact       Additional Comments:        Additional Examinations:                   Office Imaging Results/Procedures PerformedToday:             Office Procedures:     Orders Placed This Encounter   Procedures    US GUIDED NEEDLE PLACEMENT     Standing Status:   Future     Standing Expiration Date:   5/10/2023     Order Specific Question:   Reason for exam:     Answer:   HA inj    MD ARTHROCENTESIS ASPIR&/INJ MAJOR JT/BURSA W/O US     Logic E Ultrasound/ 10 HZ    The patient was placed supine on the examination table with the knees supported. The   LT     Lower extremity was slightly abducted . The ultrasound was placed on knee preset function and the linear transducer was placed transversely  over the medial patellofemoral joint and the medial patella femoral  joint recess was identified. The skin was prepped in sterile fashion. Sterile ultrasound gel  and topical anesthetic were utilized. Using axial technique, a 22 GA 40 mm needle was advanced under direct guidance into the medial patellofemoral joint recess and 2 ml of Euflexxa  was injected . The joint space was visualized distending with Injectate. There was no resistance to the Injectate. Patient tolerated this with minimal to no discomfort. Band-Aid applied to puncture wound. Technically successful ultrasound guided injection    Image stored    The media patellafemoral joint recess was visualized in short axis. No evidence of effusion, soft tissue mass or cystic lesions. Other Outside Imaging and Testing Personally Reviewed:    No results found. Assessment   Impression: . Encounter Diagnoses   Name Primary?     Primary osteoarthritis of left knee     Acquired varus deformity knee, left     Degenerative tear of lateral meniscus of left knee               Plan:       Postinjection protocol         Orders:        Orders Placed This Encounter   Procedures    US GUIDED NEEDLE PLACEMENT     Standing Status:   Future     Standing Expiration Date:   5/10/2023     Order Specific Question:   Reason for exam:     Answer:   HA inj    [place] ARTHROCENTESIS ASPIR&/INJ MAJOR JT/BURSA W/O US         Charli Gonzalez MD.      Disclaimer: \"This note was dictated with voice recognition software. Though review and correction are routine, we apologize for any errors. \"

## 2022-05-10 NOTE — PROGRESS NOTES
SUBJECTIVE:  Lor Ortega   1950   female   No Known Allergies    Chief Complaint   Patient presents with    Follow-up     test results        Patient Active Problem List    Diagnosis Date Noted    Basal cell carcinoma of left medial forehead 2016       HPI   Patient is here today for follow-up on recent DEXA showing osteoporosis and history of hyperlipidemia and prediabetes and left knee pain/OA. She is recently been evaluated and treated by Ortho and diagnosis osteoarthritis of left knee. She has started some injections which she just got the first 1 today. Patient tolerated the procedure well. She is also had a recent DEXA scan showing osteoporosis. Patient's not had previous DEXA scans or previous treatment for osteoporosis. No previous fractures. She does take calcium but intermittently. She is also had recent labs for history of hyperlipidemia and prediabetes. Recent hemoglobin A1c is at 5.9 (compared to 5.8 several years ago). Recent cholesterol labs are good with total cholesterol of 170 and LDL of 97. Patient reports she makes an effort to adhere to a good diet.   Past Medical History:   Diagnosis Date    GERD (gastroesophageal reflux disease)     Hyperlipidemia     Obesity      Social History     Socioeconomic History    Marital status: Single     Spouse name: Not on file    Number of children: Not on file    Years of education: Not on file    Highest education level: Not on file   Occupational History    Not on file   Tobacco Use    Smoking status: Former Smoker     Packs/day: 1.00     Years: 20.00     Pack years: 20.00     Types: Cigarettes     Start date: 1980     Quit date: 1997     Years since quittin.4    Smokeless tobacco: Never Used   Vaping Use    Vaping Use: Never used   Substance and Sexual Activity    Alcohol use: No    Drug use: No    Sexual activity: Not on file   Other Topics Concern    Not on file   Social History Narrative    Not on file     Social Determinants of Health     Financial Resource Strain:     Difficulty of Paying Living Expenses: Not on file   Food Insecurity:     Worried About Running Out of Food in the Last Year: Not on file    Deloris of Food in the Last Year: Not on file   Transportation Needs:     Lack of Transportation (Medical): Not on file    Lack of Transportation (Non-Medical): Not on file   Physical Activity:     Days of Exercise per Week: Not on file    Minutes of Exercise per Session: Not on file   Stress:     Feeling of Stress : Not on file   Social Connections:     Frequency of Communication with Friends and Family: Not on file    Frequency of Social Gatherings with Friends and Family: Not on file    Attends Anabaptist Services: Not on file    Active Member of [address] [place] [address] [employer] or Organizations: Not on file    Attends Club or Organization Meetings: Not on file    Marital Status: Not on file   Intimate Partner Violence:     Fear of Current or Ex-Partner: Not on file    Emotionally Abused: Not on file    Physically Abused: Not on file    Sexually Abused: Not on file   Housing Stability:     Unable to Pay for Housing in the Last Year: Not on file    Number of [place] in the Last Year: Not on file    Unstable Housing in the Last Year: Not on file     Family History   Problem Relation Age of Onset    Breast Cancer Other     Heart Disease Other     Other Other         mi    Alzheimer's Disease Other        Review of Systems   Constitutional: Negative for activity change, appetite change and unexpected weight change. Respiratory: Negative for cough, chest tightness and shortness of breath. Cardiovascular: Negative for chest pain, palpitations and leg swelling. Gastrointestinal: Negative for abdominal pain, constipation and diarrhea. Musculoskeletal: Negative for arthralgias and myalgias. Skin: Negative for rash. Neurological: Negative for light-headedness and headaches.    Hematological: Negative for adenopathy. Does not bruise/bleed easily. Psychiatric/Behavioral: Negative for dysphoric mood and sleep disturbance. The patient is not nervous/anxious. OBJECTIVE:  /60   Pulse 60   Temp 97.1 °F (36.2 °C)   Wt 169 lb (76.7 kg)   SpO2 97%   BMI 31.95 kg/m²   Physical Exam  Vitals and nursing note reviewed. Constitutional:       Appearance: She is well-developed. Neck:      Thyroid: No thyromegaly. Vascular: No JVD. Cardiovascular:      Rate and Rhythm: Normal rate and regular rhythm. Pulmonary:      Effort: Pulmonary effort is normal.      Breath sounds: Normal breath sounds. Abdominal:      General: Bowel sounds are normal.      Palpations: Abdomen is soft. Tenderness: There is no abdominal tenderness. Musculoskeletal:      Cervical back: Normal range of motion and neck supple. Skin:     Findings: No rash. Neurological:      Mental Status: She is alert and oriented to person, place, and time. Psychiatric:         Behavior: Behavior normal.         Thought Content: Thought content normal.         ASSESSMENT/PLAN:    1. Osteoporosis, unspecified osteoporosis type, unspecified pathological fracture presence  Reviewed recent DEXA scan  Trial of Fosamax with  Instructions and discussed risks and benefits    Recheck DEXA in 15 months    Continue with daily calcium and vitamin D    2. Hyperlipidemia, unspecified hyperlipidemia type  Reviewed recent lab  Appropriate diet management  We will continue to monitor    3. Prediabetes  Reviewed recent labs. We will continue to monitor. Patient to continue with diet management and lifestyle modifications    4. Primary osteoarthritis of left knee  Reviewed current treatment with Ortho. Continue follow-up as advised. No orders of the defined types were placed in this encounter.     Current Outpatient Medications   Medication Sig Dispense Refill    alendronate (FOSAMAX) 70 MG tablet Take 1 tablet by mouth every 7 days 4 tablet 2    meloxicam (MOBIC) 15 MG tablet Take 1 tablet by mouth daily 30 tablet 2    ibuprofen (ADVIL;MOTRIN) 200 MG tablet Take 800 mg by mouth       Multiple Vitamins-Minerals (MULTIVITAMIN PO) Take 1 capsule by mouth      omeprazole (PRILOSEC) 20 MG capsule TAKE ONE CAPSULE BY MOUTH EVERY DAY       No current facility-administered medications for this visit. Return in about 3 months (around 8/10/2022), or if symptoms worsen or fail to improve, for osteoporosis.     Pina Godinez MD, MD

## 2022-05-17 ENCOUNTER — OFFICE VISIT (OUTPATIENT)
Dept: ORTHOPEDIC SURGERY | Age: 72
End: 2022-05-17

## 2022-05-17 VITALS — BODY MASS INDEX: 31.93 KG/M2 | HEIGHT: 61 IN | WEIGHT: 169.09 LBS

## 2022-05-17 DIAGNOSIS — M17.12 PRIMARY OSTEOARTHRITIS OF LEFT KNEE: Primary | ICD-10-CM

## 2022-05-17 DIAGNOSIS — M23.301 DEGENERATIVE TEAR OF LATERAL MENISCUS OF LEFT KNEE: ICD-10-CM

## 2022-05-17 PROCEDURE — 20610 DRAIN/INJ JOINT/BURSA W/O US: CPT | Performed by: INTERNAL MEDICINE

## 2022-05-17 PROCEDURE — 99999 PR OFFICE/OUTPT VISIT,PROCEDURE ONLY: CPT | Performed by: INTERNAL MEDICINE

## 2022-05-17 RX ORDER — HYALURONATE SODIUM 10 MG/ML
20 SYRINGE (ML) INTRAARTICULAR ONCE
Status: COMPLETED | OUTPATIENT
Start: 2022-05-17 | End: 2022-05-17

## 2022-05-17 RX ADMIN — Medication 20 MG: at 11:15

## 2022-05-17 NOTE — PROGRESS NOTES
Chief Complaint:   Chief Complaint   Patient presents with    Knee Pain     left, noting some improvement in pain since 1st injection, still crepitus, Euf #2          History of Present Illness:       Patient is a 66 y.o. female returns follow up for the above complaint. The patient was last seen approximately 1 weeksago. The symptoms are improving since the last visit. The patient has had no further testing for the problem. Pain levels 4/10     Past Medical History:        Past Medical History:   Diagnosis Date    GERD (gastroesophageal reflux disease)     Hyperlipidemia     Obesity         Present Medications:         Current Outpatient Medications   Medication Sig Dispense Refill    alendronate (FOSAMAX) 70 MG tablet Take 1 tablet by mouth every 7 days 4 tablet 2    meloxicam (MOBIC) 15 MG tablet Take 1 tablet by mouth daily 30 tablet 2    ibuprofen (ADVIL;MOTRIN) 200 MG tablet Take 800 mg by mouth       Multiple Vitamins-Minerals (MULTIVITAMIN PO) Take 1 capsule by mouth      omeprazole (PRILOSEC) 20 MG capsule TAKE ONE CAPSULE BY MOUTH EVERY DAY       No current facility-administered medications for this visit. Allergies:      No Known Allergies        Review of Systems:    Pertinent items are noted in HPI          Vital Signs: There were no vitals filed for this visit. General Exam:     Constitutional: Patient is adequately groomed with no evidence of malnutrition    Physical Exam: left knee      Primary Exam:    Inspection: No deformity atrophy appreciable effusion      Skin: There are no rashes, ulcerations or lesions.       Gait: Nonantalgic    Neurovascular - non focal and intact       Additional Comments:        Additional Examinations:                Office Imaging Results/Procedures PerformedToday:             Office Procedures:     Orders Placed This Encounter   Procedures    US GUIDED NEEDLE PLACEMENT     Standing Status:   Future     Number of Occurrences:   1 Standing Expiration Date:   5/17/2023     Order Specific Question:   Reason for exam:     Answer:   HA inj    [place] ARTHROCENTESIS ASPIR&/INJ MAJOR JT/BURSA W/O US           Other Outside Imaging and Testing Personally Reviewed:    US GUIDED NEEDLE PLACEMENT    Result Date: 5/10/2022  Radiology result is complete; follow up with provider / physician office for radiology results    \  Logic E Ultrasound/ 10 HZ    The patient was placed supine on the examination table with the knees supported. The  LT      Lower extremity was slightly abducted . The ultrasound was placed on knee preset function and the linear transducer was placed transversely  over the medial patellofemoral joint and the medial patella femoral  joint recess was identified. The skin was prepped in sterile fashion. Sterile ultrasound gel  and topical anesthetic were utilized. Using axial technique, a 22 GA 40 mm needle was advanced under direct guidance into the medial aspect of the patellofemoral joint recess and 2 ml of Euflexxa  was injected . The injectate was visualized to flow within the recess and hydrodissect just superficial to the recess just anterior and deep to the periarticular fat. There was no resistance to the Injectate. Patient tolerated this with minimal to no discomfort. Band-Aid applied to puncture wound. Technically successful ultrasound guided injection    Image stored    The media patellafemoral joint recess was visualized in short axis. No evidence of effusion, soft tissue mass or cystic lesions. Assessment   Impression: . Encounter Diagnoses   Name Primary?     Primary osteoarthritis of left knee Yes    Degenerative tear of lateral meniscus of left knee               Plan:     Postinjection protocol and follow-up for consecutive injection           Orders:        Orders Placed This Encounter   Procedures    US GUIDED NEEDLE PLACEMENT     Standing Status:   Future     Number of Occurrences:   1     Standing Expiration Date:   5/17/2023     Order Specific Question:   Reason for exam:     Answer:   HA inj    MD ARTHROCENTESIS ASPIR&/INJ MAJOR JT/BURSA W/O US         Karyn Saldivar MD.      Disclaimer: \"This note was dictated with voice recognition software. Though review and correction are routine, we apologize for any errors. \"

## 2022-05-24 ENCOUNTER — OFFICE VISIT (OUTPATIENT)
Dept: ORTHOPEDIC SURGERY | Age: 72
End: 2022-05-24
Payer: MEDICARE

## 2022-05-24 VITALS — HEIGHT: 61 IN | WEIGHT: 169.09 LBS | BODY MASS INDEX: 31.93 KG/M2

## 2022-05-24 DIAGNOSIS — M17.12 PRIMARY OSTEOARTHRITIS OF LEFT KNEE: Primary | ICD-10-CM

## 2022-05-24 DIAGNOSIS — M23.301 DEGENERATIVE TEAR OF LATERAL MENISCUS OF LEFT KNEE: ICD-10-CM

## 2022-05-24 DIAGNOSIS — M21.062 ACQUIRED VALGUS DEFORMITY KNEE, LEFT: ICD-10-CM

## 2022-05-24 PROCEDURE — 20611 DRAIN/INJ JOINT/BURSA W/US: CPT | Performed by: INTERNAL MEDICINE

## 2022-05-24 PROCEDURE — 99999 PR OFFICE/OUTPT VISIT,PROCEDURE ONLY: CPT | Performed by: INTERNAL MEDICINE

## 2022-05-24 PROCEDURE — L1812 KO ELASTIC W/JOINTS PRE OTS: HCPCS | Performed by: INTERNAL MEDICINE

## 2022-05-24 RX ORDER — HYALURONATE SODIUM 10 MG/ML
20 SYRINGE (ML) INTRAARTICULAR ONCE
Status: COMPLETED | OUTPATIENT
Start: 2022-05-24 | End: 2022-05-24

## 2022-05-24 RX ADMIN — Medication 20 MG: at 11:49

## 2022-05-24 NOTE — PROGRESS NOTES
Chief Complaint:   Chief Complaint   Patient presents with    Knee Pain          History of Present Illness:       Patient is a 66 y.o. female returns follow up for the above complaint. The patient was last seen approximately 1 weeksago. The symptoms are improving since the last visit. The patient has had no further testing for the problem. She continues to experience intermittent pain about the lateral joint line region of the knee but symptoms are overall improved    No pattern of active related swelling    She continues on I HEP. Past Medical History:        Past Medical History:   Diagnosis Date    GERD (gastroesophageal reflux disease)     Hyperlipidemia     Obesity         Present Medications:         Current Outpatient Medications   Medication Sig Dispense Refill    alendronate (FOSAMAX) 70 MG tablet Take 1 tablet by mouth every 7 days 4 tablet 2    meloxicam (MOBIC) 15 MG tablet Take 1 tablet by mouth daily 30 tablet 2    ibuprofen (ADVIL;MOTRIN) 200 MG tablet Take 800 mg by mouth       Multiple Vitamins-Minerals (MULTIVITAMIN PO) Take 1 capsule by mouth      omeprazole (PRILOSEC) 20 MG capsule TAKE ONE CAPSULE BY MOUTH EVERY DAY       No current facility-administered medications for this visit. Allergies:      No Known Allergies        Review of Systems:    Pertinent items are noted in HPI          Vital Signs: There were no vitals filed for this visit. General Exam:     Constitutional: Patient is adequately groomed with no evidence of malnutrition    Physical Exam: left knee      Primary Exam:    Inspection: No deformity atrophy appreciable effusion        Palpation: Negligible tenderness L JL      Skin: There are no rashes, ulcerations or lesions.       Gait: Nonantalgic    Neurovascular - non focal and intact       Additional Comments:        Additional Examinations:                Office Imaging Results/Procedures PerformedToday:             Office Procedures:     Orders Placed This Encounter   Procedures    External Referral To Physical Therapy     Referral Priority:   Routine     Referral Type:   Eval and Treat     Referral Reason:   Specialty Services Required     Requested Specialty:   Physical Therapist     Number of Visits Requested:   1    DJO OA Reaction Knee Brace     Patient was prescribed a Gala Arlet OA Reaction knee brace. The left knee will require stabilization / immobilization from this semi-rigid / rigid orthosis to improve their function. The orthosis will assist in protecting the affected area, provide functional support and facilitate healing. The patient was educated and fit by a healthcare professional with expert knowledge and specialization in brace application while under the direct supervision of the physician. Verbal and written instructions for the use of and application of this item were provided. They were instructed to contact the office immediately should the brace result in increased pain, decreased sensation, increased swelling or worsening of the condition. Other Outside Imaging and Testing Personally Reviewed:    US GUIDED NEEDLE PLACEMENT    Result Date: 5/10/2022  Radiology result is complete; follow up with provider / physician office for radiology results    \  Logic E Ultrasound/ 10 HZ    The patient was placed supine on the examination table with the knees supported. The  LT      Lower extremity was slightly abducted . The ultrasound was placed on knee preset function and the linear transducer was placed transversely  over the medial patellofemoral joint and the medial patella femoral  joint recess was identified. The skin was prepped in sterile fashion. Sterile ultrasound gel  and topical anesthetic were utilized. Using axial technique, a 22 GA 40 mm needle was advanced under direct guidance into the medial aspect of the patellofemoral joint recess and 2 ml of Euflexxa  was injected .  The injectate was visualized to flow within the recess and hydrodissect just superficial to the recess just anterior and deep to the periarticular fat. There was no resistance to the Injectate. Patient tolerated this with minimal to no discomfort. Band-Aid applied to puncture wound. Technically successful ultrasound guided injection    Image stored    The media patellafemoral joint recess was visualized in short axis. No evidence of effusion, soft tissue mass or cystic lesions. Assessment   Impression: . Encounter Diagnoses   Name Primary?  Primary osteoarthritis of left knee Yes    Degenerative tear of lateral meniscus of left knee     Acquired valgus deformity knee, left               Plan:     Postinjection protocol and follow-up for consecutive injection  Continue I HEP and consider formal course of PT knee conditioning program external referral  Semirigid reaction  bracing trial  Wean meloxicam as possible relative to therapeutic benefit from HA         Orders:        Orders Placed This Encounter   Procedures    External Referral To Physical Therapy     Referral Priority:   Routine     Referral Type:   Eval and Treat     Referral Reason:   Specialty Services Required     Requested Specialty:   Physical Therapist     Number of Visits Requested:   1    DJO OA Reaction Knee Brace     Patient was prescribed a Bertrum Curry OA Reaction knee brace. The left knee will require stabilization / immobilization from this semi-rigid / rigid orthosis to improve their function. The orthosis will assist in protecting the affected area, provide functional support and facilitate healing. The patient was educated and fit by a healthcare professional with expert knowledge and specialization in brace application while under the direct supervision of the physician. Verbal and written instructions for the use of and application of this item were provided.    They were instructed to contact the office immediately should the brace result in increased pain, decreased sensation, increased swelling or worsening of the condition. Karen Weiner MD.      Disclaimer: \"This note was dictated with voice recognition software. Though review and correction are routine, we apologize for any errors. \"

## 2022-07-26 ENCOUNTER — OFFICE VISIT (OUTPATIENT)
Dept: ORTHOPEDIC SURGERY | Age: 72
End: 2022-07-26
Payer: COMMERCIAL

## 2022-07-26 ENCOUNTER — OFFICE VISIT (OUTPATIENT)
Dept: ORTHOPEDIC SURGERY | Age: 72
End: 2022-07-26
Payer: MEDICARE

## 2022-07-26 VITALS — BODY MASS INDEX: 31.93 KG/M2 | WEIGHT: 169.09 LBS | HEIGHT: 61 IN

## 2022-07-26 VITALS — HEIGHT: 61 IN | BODY MASS INDEX: 31.93 KG/M2 | WEIGHT: 169.09 LBS

## 2022-07-26 DIAGNOSIS — M21.062 ACQUIRED VALGUS DEFORMITY KNEE, LEFT: ICD-10-CM

## 2022-07-26 DIAGNOSIS — M23.301 DEGENERATIVE TEAR OF LATERAL MENISCUS OF LEFT KNEE: ICD-10-CM

## 2022-07-26 DIAGNOSIS — S62.619A CLOSED FRACTURE OF PROXIMAL PHALANX OF DIGIT OF RIGHT HAND, INITIAL ENCOUNTER: ICD-10-CM

## 2022-07-26 DIAGNOSIS — M17.12 PRIMARY OSTEOARTHRITIS OF LEFT KNEE: Primary | ICD-10-CM

## 2022-07-26 DIAGNOSIS — M79.641 RIGHT HAND PAIN: ICD-10-CM

## 2022-07-26 PROCEDURE — 1123F ACP DISCUSS/DSCN MKR DOCD: CPT | Performed by: INTERNAL MEDICINE

## 2022-07-26 PROCEDURE — 29125 APPL SHORT ARM SPLINT STATIC: CPT | Performed by: INTERNAL MEDICINE

## 2022-07-26 PROCEDURE — 99213 OFFICE O/P EST LOW 20 MIN: CPT | Performed by: INTERNAL MEDICINE

## 2022-07-26 NOTE — PROGRESS NOTES
Chief Complaint:   Chief Complaint   Patient presents with    Knee Pain     left, much improved after completing Euflexxa, but still having catching/locking sensation at lateral knee with movement to the left while sitting, post/lat knee pain if too much activity, unable to start PT due to insurance restrictions, but has been working out in gym at work, wears brace at home/night, helpful          History of Present Illness:       Patient is a 66 y.o. female returns follow up for the above complaint. The patient was last seen approximately 2 monthsago. The symptoms are improving since the last visit. The patient has had no further testing for the problem. Status post HA injection series-Euflexxa    Although the pain is improved she continues to experience episodic catching or locking about the lateral knee. This is painful in occurrence and episodic throughout the day. No activity related swelling of significance    She is using her OA  bracing intermittently           Past Medical History:        Past Medical History:   Diagnosis Date    GERD (gastroesophageal reflux disease)     Hyperlipidemia     Obesity         Present Medications:         Current Outpatient Medications   Medication Sig Dispense Refill    alendronate (FOSAMAX) 70 MG tablet Take 1 tablet by mouth every 7 days 4 tablet 2    meloxicam (MOBIC) 15 MG tablet Take 1 tablet by mouth daily 30 tablet 2    ibuprofen (ADVIL;MOTRIN) 200 MG tablet Take 800 mg by mouth       Multiple Vitamins-Minerals (MULTIVITAMIN PO) Take 1 capsule by mouth      omeprazole (PRILOSEC) 20 MG capsule TAKE ONE CAPSULE BY MOUTH EVERY DAY       No current facility-administered medications for this visit. Allergies:      No Known Allergies        Review of Systems:    Pertinent items are noted in HPI    Review of systems reviewed from Patient History Form dated on today's date and   available in the patient's chart under the Media tab.       Vital Signs:    There were no vitals filed for this visit. General Exam:     Constitutional: Patient is adequately groomed with no evidence of malnutrition    Physical Exam: left knee      Primary Exam:    Inspection: No deformity trace effusion no atrophy      Palpation: No focal tenderness over the L JL      Range of Motion: Stable unchanged from previous      Strength: Normal with SLR      Special Tests: Lateral Madhu stressing negative      Skin: There are no rashes, ulcerations or lesions. Gait: Nonantalgic     Neurovascular - non focal and intact       Additional Comments:        Additional Examinations:                   Office Imaging Results/Procedures PerformedToday:          Office Procedures:   No orders of the defined types were placed in this encounter. Other Outside Imaging and Testing Personally Reviewed:    XR HAND RIGHT (MIN 3 VIEWS)    Result Date: 7/26/2022  Radiology exam is complete. No Radiologist dictation. Please follow up with ordering provider. XR HAND RIGHT (MIN 3 VIEWS)    Result Date: 7/26/2022  Radiology exam is complete. No Radiologist dictation. Please follow up with ordering provider. Assessment   Impression: . Encounter Diagnoses   Name Primary? Primary osteoarthritis of left knee Yes    Degenerative tear of lateral meniscus of left knee     Acquired valgus deformity knee, left         Complex diffuse degenerative tear of the lateral meniscus      Plan:     Consider knee arthroscopy as a treatment option related to mechanical symptoms more than likely attributable to the complex degenerative meniscus tear   Continue semirigid OA  bracing as per previous  Continue maintenance HEP impairing use of meloxicam  Premature to consider a candidate for TKA         Orders:      No orders of the defined types were placed in this encounter. Margaret Bob MD.      Disclaimer: \"This note was dictated with voice recognition software. Though review and correction are routine, we apologize for any errors. \"

## 2022-07-26 NOTE — LETTER
[address]  [address] [facility] [address]  Phone: [phone]  Fax: [phone]    Jason Duarte MD        July 26, 2022     Patient: Modesto Harris   YOB: 1950   Date of Visit: 7/26/2022       To Whom It May Concern: It is my medical opinion that Claudia Flynn may return to light duty immediately with the following restrictions: continuous wear of splint on Right hand, computer/office work only with R hand. These restrictions are in effect for the next 1 month. If you have any questions or concerns, please don't hesitate to call.     Sincerely,      Juan Cintron MD.      Jason Duarte MD

## 2022-07-26 NOTE — PROGRESS NOTES
Chief Complaint:   Chief Complaint   Patient presents with    Hand Pain     right, was outside at work yesterday 7/25/22, walking on uneven pavement and tripped, fell on R hand, pain/swelling/bruising over fingers 4&5 and dorsal aspect of hand, most pain is at 5th MCP joint, NEW [facility] PT          History of Present Illness:       Patient is a 66 y.o. female presents with new complaint of right hand injury. Related to a fall that occurred outside of her workplace at the [employer] ([place], [place]). The symptoms show no change since the last visit. The patient has had no further testing for the problem. She tripped over a rubber mat just outside the building as she was entering. She fell to the ground impacting her right hand. She elected to treat this with aaron strapping of the fourth and fifth digits. Pain localizes small (5th) finger RT hand and is not asociated with mechanical symptoms. The patient denies subjective instability and denies new onset or progressive weakness of the hand. Pain level 3    The patient admits to a pattern of activity related swelling. Treatment to date: Aaron strapping. There is no prior history of hand trauma but prior history of remote wrist trauma    There is no prior history of autoimmune disease, inflammatory arthropathy, or crystal arthropathy.       Past Medical History:        Past Medical History:   Diagnosis Date    GERD (gastroesophageal reflux disease)     Hyperlipidemia     Obesity         Present Medications:         Current Outpatient Medications   Medication Sig Dispense Refill    alendronate (FOSAMAX) 70 MG tablet Take 1 tablet by mouth every 7 days 4 tablet 2    meloxicam (MOBIC) 15 MG tablet Take 1 tablet by mouth daily 30 tablet 2    ibuprofen (ADVIL;MOTRIN) 200 MG tablet Take 800 mg by mouth       Multiple Vitamins-Minerals (MULTIVITAMIN PO) Take 1 capsule by mouth      omeprazole (PRILOSEC) 20 MG capsule TAKE ONE CAPSULE BY MOUTH EVERY DAY       No current facility-administered medications for this visit. Allergies:      No Known Allergies        Review of Systems:    Pertinent items are noted in HPI        Vital Signs: There were no vitals filed for this visit. General Exam:     Constitutional: Patient is adequately groomed with no evidence of malnutrition    Physical Exam: right hand      Primary Exam:    Inspection: Moderate soft tissue swelling over the dorsal ulnar base of the hand and mild ecchymosis involving the volar aspect at the MCP small finger; no static malrotation or angulation      Palpation: Focal tenderness over the base of the proximal phalanx small finger      Range of Motion: Near full range in extension lacks 3 cm in forming composite fist      Strength: Purposely not assessed      Special Tests: Tinel's negative at the wrist      Skin: There are no rashes, ulcerations or lesions. Gait: Nonantalgic      Reflex intact upper     Additional Comments:        Additional Examinations:           Left Lower Extremity: Examination of the left lower extremity does not show any tenderness, deformity or injury. Range of motion is unremarkable. There is no gross instability. There are no rashes, ulcerations or lesions. Strength and tone are normal.   Neurolgic -Light touch sensation and manual muscle testing normal L2-S1. No fasiculations. Pattella tendon an      Office Imaging Results/Procedures PerformedToday:       Radiology:      X-rays obtained and reviewed in office:   Views 3 views of right hand   Location right hand 3 views   Impression there is evidence of an oblique fracture involving the proximal diaphyseal region of the proximal phalanx small finger. There is approximately 45 degrees of dorsal angulation no malrotation of the fifth MCP joint or PIP joint of the small finger. PIP and MCP joints are congruent at the fifth digit.     Three-view x-rays after placement into fiberglass splint and molding the splint in maximum flexion at the MCP joint using three-point fixation     3 views right hand  Stable appearance of the fifth metacarpal proximal shaft fracture with stable dorsal angulation now measuring approximately 40 degrees postmanipulation MCP joint remains congruent no other soft tissue or osseous abnormalities       Office Procedures:     Orders Placed This Encounter   Procedures    XR HAND RIGHT (MIN 3 VIEWS)     Standing Status:   Future     Number of Occurrences:   1     Standing Expiration Date:   7/26/2023     Order Specific Question:   Reason for exam:     Answer:   pain    XR HAND RIGHT (MIN 3 VIEWS)     Standing Status:   Future     Number of Occurrences:   1     Standing Expiration Date:   7/26/2023     Order Specific Question:   Reason for exam:     Answer:   f/u after splinting           Other Outside Imaging and Testing Personally Reviewed:    XR HAND RIGHT (MIN 3 VIEWS)    Result Date: 7/26/2022  Radiology exam is complete. No Radiologist dictation. Please follow up with ordering provider. Assessment   Impression: . Encounter Diagnoses   Name Primary? Closed fracture of proximal phalanx of digit of right hand, initial encounter     Right hand pain         Proximal diaphyseal shaft fracture with 35 degrees dorsal angulation      Plan:       Fiberglass splint immobilization ulnar gutter splint  Local measures inclusive of elevation to promote resolution of swelling  Surgical consultation with hand specialty surgery-Dr. Clotilde Garcia to assess need for pin fixation C9 approval retroactive  OTC Tylenol as needed avoid NSAIDs until further notice    The surgical consultation is urgent and the patient needs to be evaluated within the next 48 hours to 72 hours at maximum.   Proceed as outlined above       Orders:        Orders Placed This Encounter   Procedures    XR HAND RIGHT (MIN 3 VIEWS)     Standing Status:   Future     Number of Occurrences:   1     Standing Expiration Date:   7/26/2023     Order Specific Question:   Reason for exam:     Answer:   pain    XR HAND RIGHT (MIN 3 VIEWS)     Standing Status:   Future     Number of Occurrences:   1     Standing Expiration Date:   7/26/2023     Order Specific Question:   Reason for exam:     Answer:   f/u after splinting         Robyn Rossi MD.      Disclaimer: \"This note was dictated with voice recognition software. Though review and correction are routine, we apologize for any errors. \"

## 2022-07-26 NOTE — LETTER
[address]  [address] [facility] [address]  Phone: [phone]  Fax: [phone]    Shannan Friedman MD    July 26, 2022     Jakob Paz MD  [address], [address]    Patient: Princess Lin   MR Number: [id number]   YOB: 1950   Date of Visit: 7/26/2022       Dear Albania Zayas: Thank you for referring Karen Luna to me for evaluation/treatment. Below are the relevant portions of my assessment and plan of care. Impression: . Encounter Diagnoses   Name Primary?  Closed fracture of proximal phalanx of digit of right hand, initial encounter     Right hand pain         Proximal diaphyseal shaft fracture with 35 degrees dorsal angulation      Plan:       Fiberglass splint immobilization ulnar gutter splint  Local measures inclusive of elevation to promote resolution of swelling  Surgical consultation with hand specialty surgery-Dr. Maryse Lomeli to assess need for pin fixation C9 approval retroactive  OTC Tylenol as needed avoid NSAIDs until further notice    The surgical consultation is urgent and the patient needs to be evaluated within the next 48 hours to 72 hours at maximum. Proceed as outlined above       Orders:        Orders Placed This Encounter   Procedures    XR HAND RIGHT (MIN 3 VIEWS)     Standing Status:   Future     Number of Occurrences:   1     Standing Expiration Date:   7/26/2023     Order Specific Question:   Reason for exam:     Answer:   pain    XR HAND RIGHT (MIN 3 VIEWS)     Standing Status:   Future     Number of Occurrences:   1     Standing Expiration Date:   7/26/2023     Order Specific Question:   Reason for exam:     Answer:   f/u after splinting         Margaret Bob MD.      Disclaimer: \"This note was dictated with voice recognition software. Though review and correction are routine, we apologize for any errors. \"       If you have questions, please do not hesitate to call me. I look forward to following [facility] along with you.     Sincerely,      Shavonne Alex MD

## 2022-07-29 ENCOUNTER — OFFICE VISIT (OUTPATIENT)
Dept: FAMILY MEDICINE CLINIC | Age: 72
End: 2022-07-29
Payer: MEDICARE

## 2022-07-29 VITALS
DIASTOLIC BLOOD PRESSURE: 80 MMHG | TEMPERATURE: 97.2 F | SYSTOLIC BLOOD PRESSURE: 158 MMHG | OXYGEN SATURATION: 97 % | WEIGHT: 173 LBS | HEART RATE: 58 BPM | BODY MASS INDEX: 32.71 KG/M2

## 2022-07-29 DIAGNOSIS — R73.03 PREDIABETES: ICD-10-CM

## 2022-07-29 DIAGNOSIS — M17.12 PRIMARY OSTEOARTHRITIS OF LEFT KNEE: ICD-10-CM

## 2022-07-29 DIAGNOSIS — Z01.818 PRE-OP EXAM: Primary | ICD-10-CM

## 2022-07-29 DIAGNOSIS — E78.5 HYPERLIPIDEMIA, UNSPECIFIED HYPERLIPIDEMIA TYPE: ICD-10-CM

## 2022-07-29 DIAGNOSIS — S62.606A CLOSED DISPLACED FRACTURE OF PHALANX OF RIGHT LITTLE FINGER, UNSPECIFIED PHALANX, INITIAL ENCOUNTER: ICD-10-CM

## 2022-07-29 PROCEDURE — 99215 OFFICE O/P EST HI 40 MIN: CPT | Performed by: FAMILY MEDICINE

## 2022-07-29 PROCEDURE — 93000 ELECTROCARDIOGRAM COMPLETE: CPT | Performed by: FAMILY MEDICINE

## 2022-07-29 NOTE — PROGRESS NOTES
History   Problem Relation Age of Onset    Breast Cancer Other     Heart Disease Other     Other Other         mi    Alzheimer's Disease Other      Social History     Socioeconomic History    Marital status: Single     Spouse name: Not on file    Number of children: Not on file    Years of education: Not on file    Highest education level: Not on file   Occupational History    Not on file   Tobacco Use    Smoking status: Former     Packs/day: 1.00     Years: 20.00     Pack years: 20.00     Types: Cigarettes     Start date: 1980     Quit date: 1997     Years since quittin.6    Smokeless tobacco: Never   Vaping Use    Vaping Use: Never used   Substance and Sexual Activity    Alcohol use: No    Drug use: No    Sexual activity: Not on file   Other Topics Concern    Not on file   Social History Narrative    Not on file     Social Determinants of Health     Financial Resource Strain: Not on file   Food Insecurity: Not on file   Transportation Needs: Not on file   Physical Activity: Not on file   Stress: Not on file   Social Connections: Not on file   Intimate Partner Violence: Not on file   Housing Stability: Not on file       Review of Systems  A comprehensive review of systems was negative except for what was noted in the HPI. Physical Exam   Constitutional: She is oriented to person, place, and time. She appears well-developed and well-nourished. No distress. HENT:   Head: Normocephalic and atraumatic. Mouth/Throat: Uvula is midline, oropharynx is clear and moist and mucous membranes are normal.   Eyes: Conjunctivae and EOM are normal. Pupils are equal, round, and reactive to light. Neck: Trachea normal and normal range of motion. Neck supple. No JVD present. Carotid bruit is not present. No mass and no thyromegaly present. Cardiovascular: Normal rate, regular rhythm, normal heart sounds and intact distal pulses. No murmur heard.   Pulmonary/Chest: Effort normal and breath sounds normal. No respiratory distress. She has no wheezes. She has no rales. Abdominal: Soft. Normal aorta and bowel sounds are normal. She exhibits no distension and no mass. No tenderness. Musculoskeletal: She exhibits no edema and no tenderness. Neurological: She is alert and oriented to person, place, and time. She has normal strength. No cranial nerve deficit or sensory deficit. Coordination and gait normal.   Skin: Skin is warm and dry. No rash noted. No erythema. Psychiatric: She has a normal mood and affect. Her behavior is normal.     EKG Interpretation:  normal EKG, normal sinus rhythm, unchanged from previous tracings. Lab Review not applicable  Nl CBC, CMP 4/22        Assessment:       66 y.o. patient with planned surgery as above. Known risk factors for perioperative complications: None  Current medications which may produce withdrawal symptoms if withheld perioperatively: none      Plan:     1. Preoperative workup as follows: ECG, hemoglobin, hematocrit  2. Change in medication regimen before surgery: None  3. Prophylaxis for cardiac events with perioperative beta-blockers: Not indicated  ACC/AHA indications for pre-operative beta-blocker use:    Vascular surgery with history of postitive stress test  Intermediate or high risk surgery with history of CAD   Intermediate or high risk surgery with multiple clinical predictors of CAD- 2 of the following: history of compensated or prior heart failure, history of cerebrovascular disease, DM, or renal insufficiency    Routine administration of higher-dose, long-acting metoprolol in beta-blocker-naïve patients on the day of surgery, and in the absence of dose titration is associated with an overall increase in mortality. Beta-blockers should be started days to weeks prior to surgery and titrated to pulse < 70.  4. Deep vein thrombosis prophylaxis: regimen to be chosen by surgical team  5.  No contraindications to planned surgery          Addendum-blood pressure slightly elevated today patient does not have history of hypertension. She has no symptoms. Denies chest pain or shortness of breath or orthopnea. She will monitor her blood pressures at home and contact us if they remain elevated.     COVID-19 booster advised

## 2022-08-02 ENCOUNTER — OFFICE VISIT (OUTPATIENT)
Dept: ORTHOPEDIC SURGERY | Age: 72
End: 2022-08-02
Payer: COMMERCIAL

## 2022-08-02 VITALS — HEIGHT: 61 IN | BODY MASS INDEX: 32.67 KG/M2 | WEIGHT: 173.06 LBS

## 2022-08-02 DIAGNOSIS — S62.619A CLOSED DISPLACED FRACTURE OF PROXIMAL PHALANX OF FINGER OF RIGHT HAND: Primary | ICD-10-CM

## 2022-08-02 PROCEDURE — 1123F ACP DISCUSS/DSCN MKR DOCD: CPT | Performed by: INTERNAL MEDICINE

## 2022-08-02 PROCEDURE — 99213 OFFICE O/P EST LOW 20 MIN: CPT | Performed by: INTERNAL MEDICINE

## 2022-08-02 NOTE — LETTER
Jerson Willson 89  [address] [facility] [address]  Phone: [phone]  Fax: [phone]           David Lala MD      August 2, 2022     Patient: Jd Flynn   MR Number: [id number]   YOB: 1950   Date of Visit: 8/2/2022         Dear Blue Orlando,    Thanks for your assistance with Audrey's care. She presented to the office today for some reassurance. Impression: . Encounter Diagnosis   Name Primary?  Closed fracture of proximal phalanx of digit of right hand with routine healing, subsequent encounter Yes        Proximal phalanx fracture with dorsal angulation-small finger RT      Plan:   Recommend that she proceed with surgical pin fixation as discussed as a treatment option   Continue forearm-based ulnar gutter splint immobilization   Medical pain management as per previous and follow preoperative instructions    It is my medical opinion that she proceed with surgical fixation to optimize function of her hand long-term. Approximately 15 minutes was spent related to previewing pertinent medical documentation prior to the patient's visit along with counseling during the patient's visit with respect to treatment options inclusive of alternatives to treatment and the complications and risks related to those treatment options along with expectations of outcome related to those treatments and inclusive of time in the documentation and ordering of testing and treatment after the visit. Orders:      No orders of the defined types were placed in this encounter. Cheryl Joseph MD.      Disclaimer: \"This note was dictated with voice recognition software. Though review and correction are routine, we apologize for any errors. \"         If you have questions, please do not hesitate to call me. I look forward to following [facility] along with you.     Sincerely,        David Lala MD    CC providers:  Anya Arellano Barbra Muñoz MD  [address] [place] [id number]  [address] [place]

## 2022-08-02 NOTE — PROGRESS NOTES
Chief Complaint:   Chief Complaint   Patient presents with    Hand Pain     R hand, F/U has surgery tomorrow 08/03/2022. Here to consult w/ Dr. Cecily Dorsey to see his opinion and wanted to see if she needed to go through with vandana. History of Present Illness:       Patient is a 66 y.o. female returns follow up for the above complaint. The patient was last seen approximately 1 weeksago. The symptoms show no change since the last visit. The patient has had no further testing for the problem. In the interim she was evaluated by hand specialty surgery-Dr. Sumit Rivas. The office visit related to that encounter was referenced in the EMR. She is apprehensive to proceed with surgery and would like my opinion on the matter. She continues with wrist splint immobilization that was fashioned on her last visit. She is using Tylenol for symptom control         Past Medical History:        Past Medical History:   Diagnosis Date    GERD (gastroesophageal reflux disease)     Hyperlipidemia     Obesity         Present Medications:         Current Outpatient Medications   Medication Sig Dispense Refill    alendronate (FOSAMAX) 70 MG tablet Take 1 tablet by mouth every 7 days 4 tablet 2    meloxicam (MOBIC) 15 MG tablet Take 1 tablet by mouth daily 30 tablet 2    ibuprofen (ADVIL;MOTRIN) 200 MG tablet Take 800 mg by mouth       Multiple Vitamins-Minerals (MULTIVITAMIN PO) Take 1 capsule by mouth      omeprazole (PRILOSEC) 20 MG capsule TAKE ONE CAPSULE BY MOUTH EVERY DAY       No current facility-administered medications for this visit. Allergies:      No Known Allergies        Review of Systems:    Pertinent items are noted in HPI         Vital Signs: There were no vitals filed for this visit.      General Exam:     Constitutional: Patient is adequately groomed with no evidence of malnutrition    Physical Exam: right hand     Modified examination-forearm-based ulnar gutter splint immobilization of the fourth and fifth digits. Primary Exam:      Splint is intact       Skin: There are no rashes, ulcerations or lesions. Gait: Nonantalgic     Neurovascular - non focal and intact       Additional Comments:        Additional Examinations:                  Office Imaging Results/Procedures PerformedToday:        Office Procedures:   No orders of the defined types were placed in this encounter. Other Outside Imaging and Testing Personally Reviewed:    No results found. Assessment   Impression: . Encounter Diagnosis   Name Primary? Closed fracture of proximal phalanx of digit of right hand with routine healing, subsequent encounter Yes        Proximal phalanx fracture with dorsal angulation small finger -RT      Plan:   Recommend that she proceed with surgical pin fixation as previously discussed as a treatment option   Continue forearm-based ulnar gutter splint immobilization for the fourth and fifth digits  Medical pain management as per previous and follow preoperative instructions    It is my medical opinion that she proceed with surgical fixation to optimize function of her hand long-term. Approximately 15 minutes was spent related to previewing pertinent medical documentation prior to the patient's visit along with counseling during the patient's visit with respect to treatment options inclusive of alternatives to treatment and the complications and risks related to those treatment options along with expectations of outcome related to those treatments and inclusive of time in the documentation and ordering of testing and treatment after the visit. Orders:      No orders of the defined types were placed in this encounter. Terry Tyler MD.      Disclaimer: \"This note was dictated with voice recognition software. Though review and correction are routine, we apologize for any errors. \"

## 2022-08-09 ENCOUNTER — OFFICE VISIT (OUTPATIENT)
Dept: FAMILY MEDICINE CLINIC | Age: 72
End: 2022-08-09
Payer: MEDICARE

## 2022-08-09 VITALS
SYSTOLIC BLOOD PRESSURE: 118 MMHG | OXYGEN SATURATION: 98 % | HEART RATE: 63 BPM | WEIGHT: 170.2 LBS | HEIGHT: 60 IN | RESPIRATION RATE: 18 BRPM | BODY MASS INDEX: 33.41 KG/M2 | DIASTOLIC BLOOD PRESSURE: 80 MMHG | TEMPERATURE: 97.3 F

## 2022-08-09 DIAGNOSIS — R73.03 PREDIABETES: ICD-10-CM

## 2022-08-09 DIAGNOSIS — M81.0 AGE-RELATED OSTEOPOROSIS WITHOUT CURRENT PATHOLOGICAL FRACTURE: Primary | ICD-10-CM

## 2022-08-09 DIAGNOSIS — S62.606A CLOSED DISPLACED FRACTURE OF PHALANX OF RIGHT LITTLE FINGER, UNSPECIFIED PHALANX, INITIAL ENCOUNTER: ICD-10-CM

## 2022-08-09 DIAGNOSIS — E78.5 HYPERLIPIDEMIA, UNSPECIFIED HYPERLIPIDEMIA TYPE: ICD-10-CM

## 2022-08-09 PROCEDURE — 1123F ACP DISCUSS/DSCN MKR DOCD: CPT | Performed by: FAMILY MEDICINE

## 2022-08-09 PROCEDURE — 99214 OFFICE O/P EST MOD 30 MIN: CPT | Performed by: FAMILY MEDICINE

## 2022-08-09 RX ORDER — ALENDRONATE SODIUM 70 MG/1
70 TABLET ORAL
Qty: 4 TABLET | Refills: 5 | Status: SHIPPED | OUTPATIENT
Start: 2022-08-09

## 2022-08-10 ASSESSMENT — ENCOUNTER SYMPTOMS
CONSTIPATION: 0
DIARRHEA: 0
SHORTNESS OF BREATH: 0
BLOOD IN STOOL: 0
ABDOMINAL PAIN: 0
COUGH: 0
CHEST TIGHTNESS: 0

## 2022-08-10 NOTE — PROGRESS NOTES
Palpations: Abdomen is soft. Tenderness: There is no abdominal tenderness. Musculoskeletal:      Cervical back: Normal range of motion and neck supple. Skin:     Findings: No rash. Neurological:      General: No focal deficit present. Mental Status: She is alert and oriented to person, place, and time. Psychiatric:         Behavior: Behavior normal.         Thought Content: Thought content normal.       ASSESSMENT/PLAN:    1. Age-related osteoporosis without current pathological fracture  Patient is doing well on Fosamax. We will continue and refill meds    Daily calcium and vitamin D  Weightbearing exercises as tolerated    2. Hyperlipidemia, unspecified hyperlipidemia type  Reviewed last cholesterol labs. We will continue to monitor    3. Prediabetes  Reviewed last hemoglobin A1c. We will continue to monitor    Encouraged appropriate diet and increase physical activity as tolerated        4. Closed displaced fracture of phalanx of right little finger, unspecified phalanx, initial encounter  Patient is doing well post right hand surgery. Follow-up with hand surgery and PT as advised      No orders of the defined types were placed in this encounter. Current Outpatient Medications   Medication Sig Dispense Refill    alendronate (FOSAMAX) 70 MG tablet Take 1 tablet by mouth every 7 days 4 tablet 5    meloxicam (MOBIC) 15 MG tablet Take 1 tablet by mouth daily 30 tablet 2    ibuprofen (ADVIL;MOTRIN) 200 MG tablet Take 800 mg by mouth       Multiple Vitamins-Minerals (MULTIVITAMIN PO) Take 1 capsule by mouth      omeprazole (PRILOSEC) 20 MG capsule TAKE ONE CAPSULE BY MOUTH EVERY DAY       No current facility-administered medications for this visit. Return in about 6 months (around 2/9/2023), or if symptoms worsen or fail to improve, for osteoporosis.     Jay Gale MD, MD

## 2023-02-27 SDOH — ECONOMIC STABILITY: FOOD INSECURITY: WITHIN THE PAST 12 MONTHS, THE FOOD YOU BOUGHT JUST DIDN'T LAST AND YOU DIDN'T HAVE MONEY TO GET MORE.: NEVER TRUE

## 2023-02-27 SDOH — ECONOMIC STABILITY: HOUSING INSECURITY
IN THE LAST 12 MONTHS, WAS THERE A TIME WHEN YOU DID NOT HAVE A STEADY PLACE TO SLEEP OR SLEPT IN A SHELTER (INCLUDING NOW)?: NO

## 2023-02-27 SDOH — ECONOMIC STABILITY: FOOD INSECURITY: WITHIN THE PAST 12 MONTHS, YOU WORRIED THAT YOUR FOOD WOULD RUN OUT BEFORE YOU GOT MONEY TO BUY MORE.: NEVER TRUE

## 2023-02-27 SDOH — ECONOMIC STABILITY: TRANSPORTATION INSECURITY
IN THE PAST 12 MONTHS, HAS LACK OF TRANSPORTATION KEPT YOU FROM MEETINGS, WORK, OR FROM GETTING THINGS NEEDED FOR DAILY LIVING?: NO

## 2023-02-27 SDOH — ECONOMIC STABILITY: INCOME INSECURITY: HOW HARD IS IT FOR YOU TO PAY FOR THE VERY BASICS LIKE FOOD, HOUSING, MEDICAL CARE, AND HEATING?: NOT HARD AT ALL

## 2023-03-01 ENCOUNTER — OFFICE VISIT (OUTPATIENT)
Dept: FAMILY MEDICINE CLINIC | Age: 73
End: 2023-03-01
Payer: MEDICARE

## 2023-03-01 VITALS
HEART RATE: 68 BPM | SYSTOLIC BLOOD PRESSURE: 126 MMHG | DIASTOLIC BLOOD PRESSURE: 80 MMHG | OXYGEN SATURATION: 97 % | BODY MASS INDEX: 33.57 KG/M2 | TEMPERATURE: 97 F | RESPIRATION RATE: 18 BRPM | WEIGHT: 171 LBS | HEIGHT: 60 IN

## 2023-03-01 DIAGNOSIS — R73.03 PREDIABETES: ICD-10-CM

## 2023-03-01 DIAGNOSIS — Z12.31 ENCOUNTER FOR SCREENING MAMMOGRAM FOR MALIGNANT NEOPLASM OF BREAST: ICD-10-CM

## 2023-03-01 DIAGNOSIS — M81.0 OSTEOPOROSIS, UNSPECIFIED OSTEOPOROSIS TYPE, UNSPECIFIED PATHOLOGICAL FRACTURE PRESENCE: Primary | ICD-10-CM

## 2023-03-01 DIAGNOSIS — E78.5 HYPERLIPIDEMIA, UNSPECIFIED HYPERLIPIDEMIA TYPE: ICD-10-CM

## 2023-03-01 PROCEDURE — 99214 OFFICE O/P EST MOD 30 MIN: CPT | Performed by: FAMILY MEDICINE

## 2023-03-01 PROCEDURE — 1123F ACP DISCUSS/DSCN MKR DOCD: CPT | Performed by: FAMILY MEDICINE

## 2023-03-01 RX ORDER — ALENDRONATE SODIUM 70 MG/1
70 TABLET ORAL
Qty: 4 TABLET | Refills: 5 | Status: SHIPPED | OUTPATIENT
Start: 2023-03-01

## 2023-03-01 ASSESSMENT — ENCOUNTER SYMPTOMS
COUGH: 0
CHEST TIGHTNESS: 0
ABDOMINAL PAIN: 0
BLOOD IN STOOL: 0
DIARRHEA: 0
CONSTIPATION: 0
SHORTNESS OF BREATH: 0

## 2023-03-01 ASSESSMENT — PATIENT HEALTH QUESTIONNAIRE - PHQ9
SUM OF ALL RESPONSES TO PHQ QUESTIONS 1-9: 0
SUM OF ALL RESPONSES TO PHQ9 QUESTIONS 1 & 2: 0
1. LITTLE INTEREST OR PLEASURE IN DOING THINGS: 0
2. FEELING DOWN, DEPRESSED OR HOPELESS: 0
SUM OF ALL RESPONSES TO PHQ QUESTIONS 1-9: 0

## 2023-03-01 NOTE — PROGRESS NOTES
SUBJECTIVE:  Jacqueline Castro   1950   female   No Known Allergies    Chief Complaint   Patient presents with    Follow-up    Knee Pain     Left         Patient Active Problem List    Diagnosis Date Noted    Basal cell carcinoma of left medial forehead 2016       HPI   Patient is here today for follow-up on osteoporosis, hyperlipidemia and prediabetes. -She continues to do well on current management with Fosamax. Patient denies any GI complaints. No nausea vomiting or diarrhea. Denies chest pain, shortness of breath or myalgias. Denies headache change in vision or neurologic symptoms. She has had recent cholesterol labs which were good with total cholesterol at 170 and LDL of 97. Patient reports she has been working at staying with a good diet. She is also in a competition at work as far as who is getting more steps and which keeps her active and moving. She is up-to-date with her mammogram with last one being 2022. Patient has history of prediabetes and last hemoglobin A1c was stable at 5.9. No other concerns or questions today. Denies symptoms of depression or anxiety. Sleeping well.   Past Medical History:   Diagnosis Date    GERD (gastroesophageal reflux disease)     Hyperlipidemia     Obesity      Social History     Socioeconomic History    Marital status: Single     Spouse name: Not on file    Number of children: Not on file    Years of education: Not on file    Highest education level: Not on file   Occupational History    Not on file   Tobacco Use    Smoking status: Former     Packs/day: 1.00     Years: 20.00     Pack years: 20.00     Types: Cigarettes     Start date: 1980     Quit date: 1997     Years since quittin.2    Smokeless tobacco: Never   Vaping Use    Vaping Use: Never used   Substance and Sexual Activity    Alcohol use: No    Drug use: No    Sexual activity: Not on file   Other Topics Concern    Not on file   Social History Narrative    Not on file     Social Determinants of Health     Financial Resource Strain: Not on file   Food Insecurity: Not on file   Transportation Needs: Not on file   Physical Activity: Not on file   Stress: Not on file   Social Connections: Not on file   Intimate Partner Violence: Not on file   Housing Stability: Not on file     Family History   Problem Relation Age of Onset    Breast Cancer Other     Heart Disease Other     Other Other         mi    Alzheimer's Disease Other        Review of Systems   Constitutional:  Negative for activity change, appetite change and unexpected weight change.   Respiratory:  Negative for cough, chest tightness and shortness of breath.    Cardiovascular:  Negative for chest pain, palpitations and leg swelling.   Gastrointestinal:  Negative for abdominal pain, blood in stool, constipation and diarrhea.   Musculoskeletal:  Negative for arthralgias and myalgias.   Skin:  Negative for rash.   Neurological:  Negative for light-headedness and headaches.   Hematological:  Negative for adenopathy. Does not bruise/bleed easily.   Psychiatric/Behavioral:  Negative for dysphoric mood and sleep disturbance. The patient is not nervous/anxious.      OBJECTIVE:  /80   Pulse 68   Temp 97 °F (36.1 °C)   Resp 18   Ht 5' (1.524 m)   Wt 171 lb (77.6 kg)   SpO2 97%   BMI 33.40 kg/m²   Physical Exam  Vitals and nursing note reviewed.   Constitutional:       Appearance: She is well-developed.   Eyes:      Pupils: Pupils are equal, round, and reactive to light.   Neck:      Thyroid: No thyromegaly.      Vascular: No JVD.   Cardiovascular:      Rate and Rhythm: Normal rate and regular rhythm.   Pulmonary:      Effort: Pulmonary effort is normal.      Breath sounds: Normal breath sounds.   Abdominal:      General: Bowel sounds are normal.      Palpations: Abdomen is soft.      Tenderness: There is no abdominal tenderness.   Musculoskeletal:      Cervical back: Normal range of motion and neck supple.   Skin:      Findings: No rash. Neurological:      General: No focal deficit present. Mental Status: She is alert and oriented to person, place, and time. Psychiatric:         Behavior: Behavior normal.         Thought Content: Thought content normal.       ASSESSMENT/PLAN:    1. Osteoporosis, unspecified osteoporosis type, unspecified pathological fracture presence  Reviewed last DEXA    Continue and refill Fosamax  Encouraged increase physical activity and weightbearing exercises as tolerated    Dietary calcium and increase vitamin D    2. Hyperlipidemia, unspecified hyperlipidemia type  Reviewed last labs  We will continue to monitor  Continue with diet management    3. Prediabetes  Reviewed last hemoglobin A1c  Continue with increased physical activity as tolerated    We will continue to monitor      No orders of the defined types were placed in this encounter. Current Outpatient Medications   Medication Sig Dispense Refill    alendronate (FOSAMAX) 70 MG tablet Take 1 tablet by mouth every 7 days 4 tablet 5    meloxicam (MOBIC) 15 MG tablet Take 1 tablet by mouth daily 30 tablet 2    ibuprofen (ADVIL;MOTRIN) 200 MG tablet Take 800 mg by mouth       Multiple Vitamins-Minerals (MULTIVITAMIN PO) Take 1 capsule by mouth      omeprazole (PRILOSEC) 20 MG capsule TAKE ONE CAPSULE BY MOUTH EVERY DAY       No current facility-administered medications for this visit. Return in about 6 months (around 9/1/2023), or if symptoms worsen or fail to improve, for osteoporosis, hyperlipidmeia.     Paula Tyler MD, MD

## 2023-03-27 ENCOUNTER — TELEPHONE (OUTPATIENT)
Dept: FAMILY MEDICINE CLINIC | Age: 73
End: 2023-03-27

## 2023-08-22 RX ORDER — ALENDRONATE SODIUM 70 MG/1
TABLET ORAL
Qty: 4 TABLET | Refills: 5 | OUTPATIENT
Start: 2023-08-22

## 2023-08-29 ENCOUNTER — OFFICE VISIT (OUTPATIENT)
Dept: FAMILY MEDICINE CLINIC | Age: 73
End: 2023-08-29
Payer: MEDICARE

## 2023-08-29 VITALS
HEART RATE: 58 BPM | OXYGEN SATURATION: 97 % | WEIGHT: 170.4 LBS | SYSTOLIC BLOOD PRESSURE: 120 MMHG | BODY MASS INDEX: 33.45 KG/M2 | HEIGHT: 60 IN | DIASTOLIC BLOOD PRESSURE: 60 MMHG

## 2023-08-29 DIAGNOSIS — M81.0 OSTEOPOROSIS, UNSPECIFIED OSTEOPOROSIS TYPE, UNSPECIFIED PATHOLOGICAL FRACTURE PRESENCE: Primary | ICD-10-CM

## 2023-08-29 DIAGNOSIS — Z78.0 MENOPAUSE: ICD-10-CM

## 2023-08-29 DIAGNOSIS — R73.03 PREDIABETES: ICD-10-CM

## 2023-08-29 DIAGNOSIS — E78.5 HYPERLIPIDEMIA, UNSPECIFIED HYPERLIPIDEMIA TYPE: ICD-10-CM

## 2023-08-29 PROCEDURE — 99214 OFFICE O/P EST MOD 30 MIN: CPT | Performed by: FAMILY MEDICINE

## 2023-08-29 PROCEDURE — 1123F ACP DISCUSS/DSCN MKR DOCD: CPT | Performed by: FAMILY MEDICINE

## 2023-08-29 RX ORDER — ALENDRONATE SODIUM 70 MG/1
70 TABLET ORAL
Qty: 4 TABLET | Refills: 5 | Status: SHIPPED | OUTPATIENT
Start: 2023-08-29

## 2023-08-29 ASSESSMENT — ENCOUNTER SYMPTOMS
COUGH: 0
SHORTNESS OF BREATH: 0
NAUSEA: 0
DIARRHEA: 0
ABDOMINAL PAIN: 0
BLOOD IN STOOL: 0
CHEST TIGHTNESS: 0

## 2023-08-29 NOTE — PROGRESS NOTES
SUBJECTIVE:  Harry Phillips   1950   female   No Known Allergies    Chief Complaint   Patient presents with    Medication Refill        Patient Active Problem List    Diagnosis Date Noted    Basal cell carcinoma of left medial forehead 2016       HPI   Patient is here today for follow-up on osteoporosis, hyperlipidemia and prediabetes. She is currently on Fosamax and she has been taking since about a year ago. Reports she is tolerating the medication well has no specific concerns or questions today. No GI or bowel complaints. She does have history of hyperlipidemia but last cholesterol labs were good with total cholesterol at 170 and LDL of 97. Last hemoglobin A1c was at 5.9. Patient reports she does try to adhere to a good diet. Denies chest pain, shortness of breath or myalgias. No headache change in vision or any neurologic symptoms. Denies symptoms of depression or anxiety. Sleeping well. No other concerns or questions today. She is due for mammogram this year.   Past Medical History:   Diagnosis Date    GERD (gastroesophageal reflux disease)     Hyperlipidemia     Obesity      Social History     Socioeconomic History    Marital status: Single     Spouse name: Not on file    Number of children: Not on file    Years of education: Not on file    Highest education level: Not on file   Occupational History    Not on file   Tobacco Use    Smoking status: Former     Packs/day: 1.00     Years: 20.00     Pack years: 20.00     Types: Cigarettes     Start date: 1980     Quit date: 1997     Years since quittin.7    Smokeless tobacco: Never   Vaping Use    Vaping Use: Never used   Substance and Sexual Activity    Alcohol use: No    Drug use: No    Sexual activity: Not on file   Other Topics Concern    Not on file   Social History Narrative    Not on file     Social Determinants of Health     Financial Resource Strain: Not on file   Food Insecurity: Not on file   Transportation

## 2023-12-22 DIAGNOSIS — M81.0 AGE-RELATED OSTEOPOROSIS WITHOUT CURRENT PATHOLOGICAL FRACTURE: Primary | ICD-10-CM

## 2024-01-02 ENCOUNTER — PATIENT MESSAGE (OUTPATIENT)
Dept: FAMILY MEDICINE CLINIC | Age: 74
End: 2024-01-02

## 2024-01-03 NOTE — TELEPHONE ENCOUNTER
From: Barrett Mendez  To: Dr. David Zayas  Sent: 1/2/2024 4:57 PM EST  Subject: Alondronate    FYI: [facility] is out of my prescription until February 1st. Says  is back ordered until then. Had a substitute last month but that one is now out of stock as well. My tests are scheduled for 1/18.

## 2024-01-03 NOTE — TELEPHONE ENCOUNTER
Advised   patient she should be okay to wait until beginning of February.  If she would like to check to see if another pharmacy has her prescription in stock and giveus that information, we can send the prescription to a new pharmacy.

## 2024-01-18 ENCOUNTER — HOSPITAL ENCOUNTER (OUTPATIENT)
Age: 74
Discharge: HOME | End: 2024-01-18
Attending: FAMILY MEDICINE
Payer: MEDICARE

## 2024-01-18 ENCOUNTER — HOSPITAL ENCOUNTER (OUTPATIENT)
Dept: WOMENS IMAGING | Age: 74
Discharge: HOME | End: 2024-01-18
Attending: FAMILY MEDICINE
Payer: MEDICARE

## 2024-01-18 ENCOUNTER — HOSPITAL ENCOUNTER (OUTPATIENT)
Dept: GENERAL RADIOLOGY | Age: 74
Discharge: HOME | End: 2024-01-18
Attending: FAMILY MEDICINE
Payer: MEDICARE

## 2024-01-18 VITALS — WEIGHT: 160 LBS | BODY MASS INDEX: 31.25 KG/M2

## 2024-01-18 DIAGNOSIS — R73.03 PREDIABETES: ICD-10-CM

## 2024-01-18 DIAGNOSIS — E78.5 HYPERLIPIDEMIA, UNSPECIFIED HYPERLIPIDEMIA TYPE: ICD-10-CM

## 2024-01-18 DIAGNOSIS — M81.0 AGE-RELATED OSTEOPOROSIS WITHOUT CURRENT PATHOLOGICAL FRACTURE: ICD-10-CM

## 2024-01-18 DIAGNOSIS — Z12.31 ENCOUNTER FOR SCREENING MAMMOGRAM FOR MALIGNANT NEOPLASM OF BREAST: ICD-10-CM

## 2024-01-18 LAB
ALBUMIN SERPL-MCNC: 4.1 G/DL (ref 3.4–5)
ALBUMIN/GLOB SERPL: 1.6 {RATIO} (ref 1.1–2.2)
ALP SERPL-CCNC: 89 U/L (ref 40–129)
ALT SERPL-CCNC: 9 U/L (ref 10–40)
ANION GAP SERPL CALCULATED.3IONS-SCNC: 9 MMOL/L (ref 3–16)
AST SERPL-CCNC: 10 U/L (ref 15–37)
BASOPHILS # BLD: 0 K/UL (ref 0–0.2)
BASOPHILS NFR BLD: 0.6 %
BILIRUB SERPL-MCNC: 0.4 MG/DL (ref 0–1)
BUN SERPL-MCNC: 15 MG/DL (ref 7–20)
CALCIUM SERPL-MCNC: 8.6 MG/DL (ref 8.3–10.6)
CHLORIDE SERPL-SCNC: 105 MMOL/L (ref 99–110)
CHOLEST SERPL-MCNC: 223 MG/DL (ref 0–199)
CO2 SERPL-SCNC: 30 MMOL/L (ref 21–32)
CREAT SERPL-MCNC: 0.7 MG/DL (ref 0.6–1.2)
DEPRECATED RDW RBC AUTO: 15.5 % (ref 12.4–15.4)
EOSINOPHIL # BLD: 0.1 K/UL (ref 0–0.6)
EOSINOPHIL NFR BLD: 1.2 %
EST. AVERAGE GLUCOSE BLD GHB EST-MCNC: 116.9 MG/DL
GFR SERPLBLD CREATININE-BSD FMLA CKD-EPI: >60 ML/MIN/{1.73_M2}
GLUCOSE SERPL-MCNC: 91 MG/DL (ref 70–99)
HBA1C MFR BLD: 5.7 %
HCT VFR BLD AUTO: 39 % (ref 36–48)
HDLC SERPL-MCNC: 50 MG/DL (ref 40–60)
HGB BLD-MCNC: 12.5 G/DL (ref 12–16)
LDLC SERPL CALC-MCNC: 128 MG/DL
LYMPHOCYTES # BLD: 2.4 K/UL (ref 1–5.1)
LYMPHOCYTES NFR BLD: 32.7 %
MCH RBC QN AUTO: 26.6 PG (ref 26–34)
MCHC RBC AUTO-ENTMCNC: 32.1 G/DL (ref 31–36)
MCV RBC AUTO: 82.9 FL (ref 80–100)
MONOCYTES # BLD: 0.5 K/UL (ref 0–1.3)
MONOCYTES NFR BLD: 6.6 %
NEUTROPHILS # BLD: 4.4 K/UL (ref 1.7–7.7)
NEUTROPHILS NFR BLD: 58.9 %
PLATELET # BLD AUTO: 284 K/UL (ref 135–450)
PMV BLD AUTO: 9 FL (ref 5–10.5)
POTASSIUM SERPL-SCNC: 4.1 MMOL/L (ref 3.5–5.1)
PROT SERPL-MCNC: 6.7 G/DL (ref 6.4–8.2)
RBC # BLD AUTO: 4.71 M/UL (ref 4–5.2)
SODIUM SERPL-SCNC: 144 MMOL/L (ref 136–145)
TRIGL SERPL-MCNC: 223 MG/DL (ref 0–150)
VLDLC SERPL CALC-MCNC: 45 MG/DL
WBC # BLD AUTO: 7.4 K/UL (ref 4–11)

## 2024-01-18 PROCEDURE — 77063 BREAST TOMOSYNTHESIS BI: CPT

## 2024-01-18 PROCEDURE — 36415 COLL VENOUS BLD VENIPUNCTURE: CPT

## 2024-01-18 PROCEDURE — 83036 HEMOGLOBIN GLYCOSYLATED A1C: CPT

## 2024-01-18 PROCEDURE — 80053 COMPREHEN METABOLIC PANEL: CPT

## 2024-01-18 PROCEDURE — 77080 DXA BONE DENSITY AXIAL: CPT

## 2024-01-18 PROCEDURE — 85025 COMPLETE CBC W/AUTO DIFF WBC: CPT

## 2024-01-18 PROCEDURE — 80061 LIPID PANEL: CPT

## 2024-02-14 ENCOUNTER — COMMUNITY OUTREACH (OUTPATIENT)
Dept: FAMILY MEDICINE CLINIC | Age: 74
End: 2024-02-14

## 2024-03-25 RX ORDER — ALENDRONATE SODIUM 70 MG/1
TABLET ORAL
Qty: 4 TABLET | Refills: 5 | OUTPATIENT
Start: 2024-03-25

## 2024-03-26 ENCOUNTER — OFFICE VISIT (OUTPATIENT)
Dept: FAMILY MEDICINE CLINIC | Age: 74
End: 2024-03-26
Payer: MEDICARE

## 2024-03-26 VITALS
WEIGHT: 162 LBS | HEIGHT: 60 IN | DIASTOLIC BLOOD PRESSURE: 76 MMHG | OXYGEN SATURATION: 98 % | SYSTOLIC BLOOD PRESSURE: 160 MMHG | BODY MASS INDEX: 31.8 KG/M2 | HEART RATE: 83 BPM

## 2024-03-26 DIAGNOSIS — R03.0 ELEVATED BLOOD PRESSURE READING: ICD-10-CM

## 2024-03-26 DIAGNOSIS — Z12.11 COLON CANCER SCREENING: ICD-10-CM

## 2024-03-26 DIAGNOSIS — Z00.00 MEDICARE ANNUAL WELLNESS VISIT, SUBSEQUENT: Primary | ICD-10-CM

## 2024-03-26 PROCEDURE — G0439 PPPS, SUBSEQ VISIT: HCPCS | Performed by: FAMILY MEDICINE

## 2024-03-26 PROCEDURE — 1123F ACP DISCUSS/DSCN MKR DOCD: CPT | Performed by: FAMILY MEDICINE

## 2024-03-26 RX ORDER — METHYLPREDNISOLONE 4 MG/1
TABLET ORAL
Qty: 1 KIT | Refills: 0 | Status: SHIPPED | OUTPATIENT
Start: 2024-03-26 | End: 2024-04-01

## 2024-03-26 SDOH — ECONOMIC STABILITY: FOOD INSECURITY: WITHIN THE PAST 12 MONTHS, YOU WORRIED THAT YOUR FOOD WOULD RUN OUT BEFORE YOU GOT MONEY TO BUY MORE.: NEVER TRUE

## 2024-03-26 SDOH — ECONOMIC STABILITY: FOOD INSECURITY: WITHIN THE PAST 12 MONTHS, THE FOOD YOU BOUGHT JUST DIDN'T LAST AND YOU DIDN'T HAVE MONEY TO GET MORE.: NEVER TRUE

## 2024-03-26 SDOH — ECONOMIC STABILITY: INCOME INSECURITY: HOW HARD IS IT FOR YOU TO PAY FOR THE VERY BASICS LIKE FOOD, HOUSING, MEDICAL CARE, AND HEATING?: NOT HARD AT ALL

## 2024-03-26 ASSESSMENT — LIFESTYLE VARIABLES
HOW OFTEN DO YOU HAVE A DRINK CONTAINING ALCOHOL: NEVER
HOW MANY STANDARD DRINKS CONTAINING ALCOHOL DO YOU HAVE ON A TYPICAL DAY: PATIENT DOES NOT DRINK

## 2024-03-26 ASSESSMENT — PATIENT HEALTH QUESTIONNAIRE - PHQ9
SUM OF ALL RESPONSES TO PHQ9 QUESTIONS 1 & 2: 0
SUM OF ALL RESPONSES TO PHQ QUESTIONS 1-9: 0
SUM OF ALL RESPONSES TO PHQ QUESTIONS 1-9: 0
1. LITTLE INTEREST OR PLEASURE IN DOING THINGS: NOT AT ALL
SUM OF ALL RESPONSES TO PHQ QUESTIONS 1-9: 0
2. FEELING DOWN, DEPRESSED OR HOPELESS: NOT AT ALL
SUM OF ALL RESPONSES TO PHQ QUESTIONS 1-9: 0

## 2024-03-26 NOTE — PROGRESS NOTES
Abnormal    Do you eat balanced/healthy meals regularly Interventions:  Patient comments: Patient is aware she needs to make some changes lifestyle and diet  Patient declines any further evaluation or treatment  Obesity Interventions:  Patient declines any further evaluation or treatment  Encouraged decrease calorie intake and avoiding processed food            Dentist Screen:  Have you seen the dentist within the past year?: (!) No    Intervention:  Advised to schedule with their dentist     Vision Screen:  Do you have difficulty driving, watching TV, or doing any of your daily activities because of your eyesight?: No  Have you had an eye exam within the past year?: (!) No  No results found.    Interventions:   Patient encouraged to make appointment with their eye specialist    Safety:  Do you have non-slip mats or non-slip surfaces or shower bars or grab bars in your shower or bathtub?: (!) No  Interventions:  Patient declined any further interventions or treatment  Patient encouraged       [place] lives baby and            Objective   Vitals:    03/26/24 1553 03/26/24 1601   BP: (!) 160/82 (!) 160/76   Site: Left Upper Arm Right Upper Arm   Position: Sitting Sitting   Cuff Size: Medium Adult Medium Adult   Pulse: 83    SpO2: 98%    Weight: 73.5 kg (162 lb)    Height: 1.524 m (5')       Body mass index is 31.64 kg/m².        General Appearance: alert and oriented to person, place and time, well-developed and well-nourished, in no acute distress  Neck: neck supple and non tender without mass, no thyromegaly or thyroid nodules, no cervical lymphadenopathy   Pulmonary/Chest: clear to auscultation bilaterally- no wheezes, rales or rhonchi, normal air movement, no respiratory distress  Cardiovascular: normal rate, intact distal pulses, and no carotid bruits  Abdomen: soft, non-tender, non-distended, normal bowel sounds, no masses or organomegaly       No Known Allergies  Prior to Visit Medications    Medication Sig Taking?

## 2024-04-15 LAB — NONINV COLON CA DNA+OCC BLD SCRN STL QL: NEGATIVE

## 2024-05-29 ENCOUNTER — OFFICE VISIT (OUTPATIENT)
Dept: FAMILY MEDICINE CLINIC | Age: 74
End: 2024-05-29
Payer: MEDICARE

## 2024-05-29 VITALS
BODY MASS INDEX: 31.61 KG/M2 | DIASTOLIC BLOOD PRESSURE: 70 MMHG | SYSTOLIC BLOOD PRESSURE: 160 MMHG | HEART RATE: 68 BPM | OXYGEN SATURATION: 98 % | HEIGHT: 60 IN | WEIGHT: 161 LBS

## 2024-05-29 DIAGNOSIS — I10 PRIMARY HYPERTENSION: Primary | ICD-10-CM

## 2024-05-29 DIAGNOSIS — E78.5 HYPERLIPIDEMIA, UNSPECIFIED HYPERLIPIDEMIA TYPE: ICD-10-CM

## 2024-05-29 DIAGNOSIS — M81.0 OSTEOPOROSIS, UNSPECIFIED OSTEOPOROSIS TYPE, UNSPECIFIED PATHOLOGICAL FRACTURE PRESENCE: ICD-10-CM

## 2024-05-29 DIAGNOSIS — M17.12 PRIMARY OSTEOARTHRITIS OF LEFT KNEE: ICD-10-CM

## 2024-05-29 DIAGNOSIS — R73.03 PREDIABETES: ICD-10-CM

## 2024-05-29 PROCEDURE — 99214 OFFICE O/P EST MOD 30 MIN: CPT | Performed by: FAMILY MEDICINE

## 2024-05-29 PROCEDURE — 3077F SYST BP >= 140 MM HG: CPT | Performed by: FAMILY MEDICINE

## 2024-05-29 PROCEDURE — 1123F ACP DISCUSS/DSCN MKR DOCD: CPT | Performed by: FAMILY MEDICINE

## 2024-05-29 PROCEDURE — 3078F DIAST BP <80 MM HG: CPT | Performed by: FAMILY MEDICINE

## 2024-05-29 RX ORDER — LOSARTAN POTASSIUM 25 MG/1
25 TABLET ORAL DAILY
Qty: 30 TABLET | Refills: 0 | Status: SHIPPED | OUTPATIENT
Start: 2024-05-29

## 2024-05-29 ASSESSMENT — ENCOUNTER SYMPTOMS
COUGH: 0
ABDOMINAL PAIN: 0
CHEST TIGHTNESS: 0
SHORTNESS OF BREATH: 0
BLOOD IN STOOL: 0

## 2024-05-29 NOTE — PROGRESS NOTES
SUBJECTIVE:  Barrett Mendez   1950   female   No Known Allergies    Chief Complaint   Patient presents with    Follow-up    Hypertension        Patient Active Problem List    Diagnosis Date Noted    Senile osteoporosis 01/18/2024    Basal cell carcinoma of left medial forehead 05/18/2016       HPI   Patient returns today for follow-up on elevated blood pressure and osteoporosis, prediabetes, hyperlipidemia and OA of knees.  Patient has been found to have elevated blood pressure in the last several office visits.  She also has a log of blood pressure readings at home which vary from 130s-160s/70-80s.  She does not have a previous history of hypertension.  She is not currently on any OTC medications.  She has recently discontinued Fosamax after DEXA scan showing osteopenia.  Patient has had a history of prediabetes and last hemoglobin A1c is at 5.7.  Last cholesterol labs were elevated with total cholesterol at 223 (up from 170) and  (up from 97) patient denies chest pain, shortness of breath or myalgias.  No headache change in vision or neurologic symptoms.  No orthopnea lower extremity edema lightheadedness or syncope.  No other concerns or questions today.  Patient continues to have some problems with OA of left knee.  Dad reports now the treatment modalities that used to help in the past are not and she wants to go back to her orthopedic doctor who she has seen in the past.  No recent injuries.  No swelling or erythema.  Past Medical History:   Diagnosis Date    GERD (gastroesophageal reflux disease)     Hyperlipidemia     Obesity      Social History     Socioeconomic History    Marital status: Single     Spouse name: Not on file    Number of children: Not on file    Years of education: Not on file    Highest education level: Not on file   Occupational History    Not on file   Tobacco Use    Smoking status: Former     Current packs/day: 0.00     Average packs/day: 0.9 packs/day for 29.0 years

## 2024-06-11 ENCOUNTER — OFFICE VISIT (OUTPATIENT)
Dept: ORTHOPEDIC SURGERY | Age: 74
End: 2024-06-11
Payer: MEDICARE

## 2024-06-11 VITALS — HEIGHT: 61 IN | BODY MASS INDEX: 30.96 KG/M2 | WEIGHT: 164 LBS

## 2024-06-11 DIAGNOSIS — M21.062 ACQUIRED VALGUS DEFORMITY KNEE, LEFT: ICD-10-CM

## 2024-06-11 DIAGNOSIS — M25.562 LEFT KNEE PAIN, UNSPECIFIED CHRONICITY: ICD-10-CM

## 2024-06-11 DIAGNOSIS — M23.301 DEGENERATIVE TEAR OF LATERAL MENISCUS OF LEFT KNEE: ICD-10-CM

## 2024-06-11 DIAGNOSIS — M17.12 PRIMARY OSTEOARTHRITIS OF LEFT KNEE: Primary | ICD-10-CM

## 2024-06-11 PROCEDURE — 99214 OFFICE O/P EST MOD 30 MIN: CPT | Performed by: INTERNAL MEDICINE

## 2024-06-11 PROCEDURE — 1123F ACP DISCUSS/DSCN MKR DOCD: CPT | Performed by: INTERNAL MEDICINE

## 2024-06-11 RX ORDER — MULTIVIT WITH MINERALS/LUTEIN
1000 TABLET ORAL DAILY
COMMUNITY

## 2024-06-11 NOTE — PROGRESS NOTES
Chief Complaint:   Chief Complaint   Patient presents with    Knee Pain     Left Knee - Knee has gotten much worse. It is locking up and buckling. She is wearing the brace all day every day.          History of Present Illness:       Patient is a 73 y.o. female presents with the above complaint. The symptoms began worsening  a few  monthsago and started without an injury. The patient describes a painful locking and stiffness pain that does not radiate.  The symptoms are intermittent  and are are worsening since the onset.       Pain localizes to the lateral side of the knee and  does not seems to follow a typical patella femoral provacative pattern.  There are mechanical symptoms that suggest meniscal or chondral injury.  These events correlate when rising from seated positions.  The patient denies subjective instability about the knee and denies new onset weakness of the lower extremity.    She continues with semirigid OA  brace with benefit    Pain level 6    The patient denies a pattern of activity related swelling.      Treatment to date: Other injection HA with good improvement 5/2022    There is no prior history of knee trauma. Workup has included MRI    There is no prior history of autoimmune disease, inflammatory arthropathy, or crystal arthropathy.            Past Medical History:        Past Medical History:   Diagnosis Date    GERD (gastroesophageal reflux disease)     Hyperlipidemia     Obesity          Past Surgical History:   Procedure Laterality Date    BREAST BIOPSY Right 2014    benign    MOHS SURGERY  05/18/2016    Left medial forehead    TUBAL LIGATION  1978         Present Medications:         Current Outpatient Medications   Medication Sig Dispense Refill    Ascorbic Acid (VITAMIN C) 1000 MG tablet Take 1 tablet by mouth daily      losartan (COZAAR) 25 MG tablet Take 1 tablet by mouth daily 30 tablet 0    ibuprofen (ADVIL;MOTRIN) 200 MG tablet Take 4 tablets by mouth      omeprazole

## 2024-06-17 ENCOUNTER — TELEPHONE (OUTPATIENT)
Dept: ORTHOPEDIC SURGERY | Age: 74
End: 2024-06-17

## 2024-06-17 NOTE — TELEPHONE ENCOUNTER
LVM for pt that Euflexxa for left knee approved through insurance and she can schedule on a Wednesday morning for our ultrasound/injection clinic.  First available date for this is 6/26/24, and she will need to be scheduled weekly for 3 weeks to complete the Euflexxa series.

## 2024-06-19 ENCOUNTER — TELEPHONE (OUTPATIENT)
Dept: ORTHOPEDIC SURGERY | Age: 74
End: 2024-06-19

## 2024-06-19 NOTE — TELEPHONE ENCOUNTER
General Question     Subject: PHYSICAL THERAPY   Patient and /or Facility Request: MICHEL SNIDER  Contact Number: [phone]    PATIENT CALLED TO SPEAK WITH CLINICAL TO INQUIRE OF PT-WOULD LIKE TO SUBMIT AUTHORIZATION THROUGH INSURANCE TO APPROVE FOR PT.     PT WOULD BE COMPLETED AT [facility].    PLEASE ADVISE

## 2024-06-21 NOTE — TELEPHONE ENCOUNTER
S/W pt, to advise that PT does the benefits check for PT auth.  Once a PT eval is sched, this sends a request for PA.  Pt states that previously [facility] was OON for [employer] insurance.  I have notified PT of this info, and they will call the pt to schedule PT to do a PA for PT.    Also sched pt for Euflexxa injections.

## 2024-06-24 PROBLEM — S62.616A DISPLACED FRACTURE OF PROXIMAL PHALANX OF RIGHT LITTLE FINGER, INITIAL ENCOUNTER FOR CLOSED FRACTURE: Status: ACTIVE | Noted: 2022-07-28

## 2024-06-25 RX ORDER — LOSARTAN POTASSIUM 25 MG/1
25 TABLET ORAL DAILY
Qty: 30 TABLET | Refills: 0 | OUTPATIENT
Start: 2024-06-25

## 2024-06-26 ENCOUNTER — OFFICE VISIT (OUTPATIENT)
Dept: ORTHOPEDIC SURGERY | Age: 74
End: 2024-06-26
Payer: MEDICARE

## 2024-06-26 VITALS — HEIGHT: 61 IN | WEIGHT: 164.02 LBS | BODY MASS INDEX: 30.97 KG/M2

## 2024-06-26 DIAGNOSIS — M17.12 PRIMARY OSTEOARTHRITIS OF LEFT KNEE: Primary | ICD-10-CM

## 2024-06-26 DIAGNOSIS — M23.301 DEGENERATIVE TEAR OF LATERAL MENISCUS OF LEFT KNEE: ICD-10-CM

## 2024-06-26 DIAGNOSIS — M21.062 ACQUIRED VALGUS DEFORMITY KNEE, LEFT: ICD-10-CM

## 2024-06-26 PROCEDURE — 99999 PR OFFICE/OUTPT VISIT,PROCEDURE ONLY: CPT | Performed by: INTERNAL MEDICINE

## 2024-06-26 PROCEDURE — 20610 DRAIN/INJ JOINT/BURSA W/O US: CPT | Performed by: INTERNAL MEDICINE

## 2024-06-26 RX ORDER — HYALURONATE SODIUM 10 MG/ML
20 SYRINGE (ML) INTRAARTICULAR ONCE
Status: COMPLETED | OUTPATIENT
Start: 2024-06-26 | End: 2024-06-26

## 2024-06-26 RX ADMIN — Medication 20 MG: at 12:54

## 2024-06-26 NOTE — PROGRESS NOTES
Chief Complaint:   Chief Complaint   Patient presents with    Knee Pain     left, Euflexxa #1 today          History of Present Illness:       Patient is a 73 y.o. female returns follow up for the above complaint. The patient was last seen approximately 2 weeksago. The symptoms show no change since the last visit. The patient has had no further testing for the problem.      Symptoms remain problematic she would like to proceed with HA injection     Past Medical History:        Past Medical History:   Diagnosis Date    GERD (gastroesophageal reflux disease)     Hyperlipidemia     Obesity         Present Medications:         Current Outpatient Medications   Medication Sig Dispense Refill    Ascorbic Acid (VITAMIN C) 1000 MG tablet Take 1 tablet by mouth daily      losartan (COZAAR) 25 MG tablet Take 1 tablet by mouth daily 30 tablet 0    ibuprofen (ADVIL;MOTRIN) 200 MG tablet Take 4 tablets by mouth      omeprazole (PRILOSEC) 20 MG capsule TAKE ONE CAPSULE BY MOUTH EVERY DAY       No current facility-administered medications for this visit.         Allergies:      No Known Allergies        Review of Systems:    Pertinent items are noted in HPI         Vital Signs:    There were no vitals filed for this visit.     General Exam:     Constitutional: Patient is adequately groomed with no evidence of malnutrition    Physical Exam: left knee      Primary Exam:    Inspection: No atrophy or appreciable effusion      Skin: There are no rashes, ulcerations or lesions.      Gait: Non antalgic      Neurovascular - non focal and intact       Additional Comments:        Additional Examinations:                   Office Imaging Results/Procedures PerformedToday:             Office Procedures:   No orders of the defined types were placed in this encounter.    Logic E Ultrasound/ 10 HZ    The patient was placed supine on the examination table with the knees supported. The        Lower extremity was slightly abducted . The ultrasound

## 2024-07-01 RX ORDER — LOSARTAN POTASSIUM 25 MG/1
25 TABLET ORAL DAILY
Qty: 30 TABLET | Refills: 0 | OUTPATIENT
Start: 2024-07-01

## 2024-07-02 ENCOUNTER — OFFICE VISIT (OUTPATIENT)
Dept: ORTHOPEDIC SURGERY | Age: 74
End: 2024-07-02
Payer: MEDICARE

## 2024-07-02 ENCOUNTER — OFFICE VISIT (OUTPATIENT)
Dept: FAMILY MEDICINE CLINIC | Age: 74
End: 2024-07-02
Payer: MEDICARE

## 2024-07-02 ENCOUNTER — HOSPITAL ENCOUNTER (OUTPATIENT)
Dept: PHYSICAL THERAPY | Age: 74
Setting detail: THERAPIES SERIES
Discharge: HOME | End: 2024-07-02
Payer: MEDICARE

## 2024-07-02 VITALS — WEIGHT: 168 LBS | BODY MASS INDEX: 32.98 KG/M2 | HEIGHT: 60 IN

## 2024-07-02 VITALS
SYSTOLIC BLOOD PRESSURE: 138 MMHG | OXYGEN SATURATION: 98 % | HEIGHT: 60 IN | BODY MASS INDEX: 32.98 KG/M2 | WEIGHT: 168 LBS | DIASTOLIC BLOOD PRESSURE: 68 MMHG | HEART RATE: 72 BPM

## 2024-07-02 DIAGNOSIS — M23.301 DEGENERATIVE TEAR OF LATERAL MENISCUS OF LEFT KNEE: ICD-10-CM

## 2024-07-02 DIAGNOSIS — M62.81 QUADRICEPS WEAKNESS: ICD-10-CM

## 2024-07-02 DIAGNOSIS — R73.03 PREDIABETES: ICD-10-CM

## 2024-07-02 DIAGNOSIS — E78.5 HYPERLIPIDEMIA, UNSPECIFIED HYPERLIPIDEMIA TYPE: ICD-10-CM

## 2024-07-02 DIAGNOSIS — M25.562 LEFT KNEE PAIN, UNSPECIFIED CHRONICITY: Primary | ICD-10-CM

## 2024-07-02 DIAGNOSIS — M17.12 PRIMARY OSTEOARTHRITIS OF LEFT KNEE: Primary | ICD-10-CM

## 2024-07-02 DIAGNOSIS — M21.062 ACQUIRED VALGUS DEFORMITY KNEE, LEFT: ICD-10-CM

## 2024-07-02 DIAGNOSIS — M17.12 PRIMARY OSTEOARTHRITIS OF LEFT KNEE: ICD-10-CM

## 2024-07-02 DIAGNOSIS — I10 PRIMARY HYPERTENSION: Primary | ICD-10-CM

## 2024-07-02 PROCEDURE — 3075F SYST BP GE 130 - 139MM HG: CPT | Performed by: FAMILY MEDICINE

## 2024-07-02 PROCEDURE — 99214 OFFICE O/P EST MOD 30 MIN: CPT | Performed by: FAMILY MEDICINE

## 2024-07-02 PROCEDURE — 20610 DRAIN/INJ JOINT/BURSA W/O US: CPT | Performed by: INTERNAL MEDICINE

## 2024-07-02 PROCEDURE — 3078F DIAST BP <80 MM HG: CPT | Performed by: FAMILY MEDICINE

## 2024-07-02 PROCEDURE — 1123F ACP DISCUSS/DSCN MKR DOCD: CPT | Performed by: FAMILY MEDICINE

## 2024-07-02 PROCEDURE — 99999 PR OFFICE/OUTPT VISIT,PROCEDURE ONLY: CPT | Performed by: INTERNAL MEDICINE

## 2024-07-02 PROCEDURE — 97110 THERAPEUTIC EXERCISES: CPT | Performed by: PHYSICAL THERAPIST

## 2024-07-02 PROCEDURE — 97161 PT EVAL LOW COMPLEX 20 MIN: CPT | Performed by: PHYSICAL THERAPIST

## 2024-07-02 RX ORDER — HYALURONATE SODIUM 10 MG/ML
20 SYRINGE (ML) INTRAARTICULAR ONCE
Status: COMPLETED | OUTPATIENT
Start: 2024-07-02 | End: 2024-07-02

## 2024-07-02 RX ORDER — LOSARTAN POTASSIUM 25 MG/1
25 TABLET ORAL DAILY
Qty: 30 TABLET | Refills: 2 | Status: SHIPPED | OUTPATIENT
Start: 2024-07-02

## 2024-07-02 RX ADMIN — Medication 20 MG: at 14:52

## 2024-07-02 ASSESSMENT — ENCOUNTER SYMPTOMS
CHEST TIGHTNESS: 0
BLOOD IN STOOL: 0
ABDOMINAL PAIN: 0
DIARRHEA: 0
SHORTNESS OF BREATH: 0
CONSTIPATION: 0
COUGH: 0

## 2024-07-02 NOTE — PROGRESS NOTES
SUBJECTIVE:  Barrett ROA Vanessa   1950   female   No Known Allergies    Chief Complaint   Patient presents with    Follow-up        Patient Active Problem List    Diagnosis Date Noted    Senile osteoporosis 01/18/2024    Displaced fracture of proximal phalanx of right little finger, initial encounter for closed fracture 07/28/2022    Basal cell carcinoma of left medial forehead 05/18/2016    Venous (peripheral) insufficiency 08/10/2009     Formatting of this note might be different from the original.   ICD-10 Transition  Formatting of this note might be different from the original.   Formatting of this note might be different from the original.   ICD-10 Transition      Esophageal reflux 06/19/2008    Insomnia 06/19/2008     Formatting of this note might be different from the original.   ICD-10 Transition  Formatting of this note might be different from the original.   Formatting of this note might be different from the original.   ICD-10 Transition      Other hyperlipidemia 06/19/2008       HPI   Patient returns today for follow-up on hypertension and new medications, OA, prediabetes and hyperlipidemia.  Patient recently started losartan 25 mg daily.  She is tolerating the medication well.  She has some ambulatory blood pressure readings which are good especially more recently.  Patient denies chest pain, shortness of breath or myalgias.  Denies headache change in vision or neurologic symptoms.  No GI or bowel complaints.  She is currently followed by Ortho for OA of left knee and has received an injection and currently going through physical therapy.  She is due for her second injection here shortly.  Reports injections and PT have been helping with symptoms.  Denies symptoms of depression and anxiety.  Sleeping well.  Patient has history of hyperlipidemia but not currently on any medications.  Last cholesterol is at 223 with  (up from 97).  Patient also has history of prediabetes and last hemoglobin

## 2024-07-02 NOTE — PLAN OF CARE
[facility] - [facility] [address], [place] [address] office: [phone] fax: [phone]     Physical Therapy Initial Evaluation Certification      Dear Leroy Webster*,    We had the pleasure of evaluating the following patient for physical therapy services at [facility].  A summary of our findings can be found in the initial assessment below.  This includes our plan of care.  If you have any questions or concerns regarding these findings, please do not hesitate to contact me at the office phone number listed above.  Thank you for the referral.     Physician Signature:_______________________________Date:__________________  By signing above (or electronic signature), therapist’s plan is approved by physician       Physical Therapy: TREATMENT/PROGRESS NOTE   Patient: Barrett Mendez (73 y.o. female)   Examination Date: 2024   :  1950 MRN: [id number]   Visit #: 1   Insurance Allowable Auth Needed   auth [x]Yes    []No    Insurance: Payor: [employer] / Plan: [employer] / Product Type: *No Product type* /   Insurance ID: [id number] - (Medicare Managed)  Secondary Insurance (if applicable):    Treatment Diagnosis:     ICD-10-CM    1. Left knee pain, unspecified chronicity  M25.562       2. Quadriceps weakness  M62.81          Medical Diagnosis:  Left knee pain, unspecified chronicity [M25.562]  Primary osteoarthritis of left knee [M17.12]  Acquired valgus deformity knee, left [M21.062]   Referring Physician: Leroy Webster*  PCP: David Zayas MD     Plan of care signed (Y/N):     Date of Patient follow up with Physician:      Progress Report/POC: EVAL today  POC update due: (10 visits /OR AUTH LIMITS, whichever is less)  2024                                             Precautions/ Contra-indications:           Latex allergy:  NO  Pacemaker:    NO  Contraindications for  Island Pedicle Flap With Canthal Suspension Text: The defect edges were debeveled with a #15 scalpel blade.  Given the location of the defect, shape of the defect and the proximity to free margins an island pedicle advancement flap was deemed most appropriate.  Using a sterile surgical marker, an appropriate advancement flap was drawn incorporating the defect, outlining the appropriate donor tissue and placing the expected incisions within the relaxed skin tension lines where possible. The area thus outlined was incised deep to adipose tissue with a #15 scalpel blade.  The skin margins were undermined to an appropriate distance in all directions around the primary defect and laterally outward around the island pedicle utilizing iris scissors.  There was minimal undermining beneath the pedicle flap. A suspension suture was placed in the canthal tendon to prevent tension and prevent ectropion.

## 2024-07-05 NOTE — PROGRESS NOTES
Chief Complaint:   Chief Complaint   Patient presents with    Knee Pain     LEFT KNEE-EUFLEXXA #2          History of Present Illness:       Patient is a 73 y.o. female returns follow up for the above complaint. The patient was last seen approximately 1 weeksago. The symptoms show no change since the last visit. The patient has had no further testing for the problem.      No adverse effects to previous injection    She would like to proceed with consecutive injection         Past Medical History:        Past Medical History:   Diagnosis Date    GERD (gastroesophageal reflux disease)     Hyperlipidemia     Obesity         Present Medications:         Current Outpatient Medications   Medication Sig Dispense Refill    losartan (COZAAR) 25 MG tablet Take 1 tablet by mouth daily 30 tablet 2    Ascorbic Acid (VITAMIN C) 1000 MG tablet Take 1 tablet by mouth daily      ibuprofen (ADVIL;MOTRIN) 200 MG tablet Take 4 tablets by mouth      omeprazole (PRILOSEC) 20 MG capsule TAKE ONE CAPSULE BY MOUTH EVERY DAY       No current facility-administered medications for this visit.         Allergies:      No Known Allergies        Review of Systems:    Pertinent items are noted in HPI         Vital Signs:    There were no vitals filed for this visit.     General Exam:     Constitutional: Patient is adequately groomed with no evidence of malnutrition    Physical Exam: Right Left      Primary Exam:    Inspection: No appreciable effusion      Skin: There are no rashes, ulcerations or lesions.      Gait: Nonantalgic      Neurovascular - non focal and intact       Additional Comments:        Additional Examinations:                    Office Imaging Results/Procedures PerformedToday:             Office Procedures:     Orders Placed This Encounter   Procedures    US GUIDED NEEDLE PLACEMENT     Standing Status:   Future     Number of Occurrences:   1     Standing Expiration Date:   7/2/2025     Order Specific Question:   Reason for exam:

## 2024-07-10 ENCOUNTER — OFFICE VISIT (OUTPATIENT)
Dept: ORTHOPEDIC SURGERY | Age: 74
End: 2024-07-10
Payer: MEDICARE

## 2024-07-10 VITALS — WEIGHT: 167.99 LBS | BODY MASS INDEX: 32.98 KG/M2 | HEIGHT: 60 IN

## 2024-07-10 DIAGNOSIS — M21.062 ACQUIRED VALGUS DEFORMITY KNEE, LEFT: ICD-10-CM

## 2024-07-10 DIAGNOSIS — M23.301 DEGENERATIVE TEAR OF LATERAL MENISCUS OF LEFT KNEE: ICD-10-CM

## 2024-07-10 DIAGNOSIS — M17.12 PRIMARY OSTEOARTHRITIS OF LEFT KNEE: Primary | ICD-10-CM

## 2024-07-10 PROCEDURE — 20611 DRAIN/INJ JOINT/BURSA W/US: CPT | Performed by: INTERNAL MEDICINE

## 2024-07-10 RX ORDER — HYALURONATE SODIUM 10 MG/ML
20 SYRINGE (ML) INTRAARTICULAR ONCE
Status: COMPLETED | OUTPATIENT
Start: 2024-07-10 | End: 2024-07-10

## 2024-07-10 RX ADMIN — Medication 20 MG: at 10:04

## 2024-07-10 NOTE — PROGRESS NOTES
Chief Complaint:   No chief complaint on file.         History of Present Illness:       Patient is a 73 y.o. female returns follow up for the above complaint. The patient was last seen approximately 1 weeksago. The symptoms show no change since the last visit. The patient has had no further testing for the problem.      No adverse effects to previous injection    She would like to proceed with consecutive injection         Past Medical History:        Past Medical History:   Diagnosis Date    GERD (gastroesophageal reflux disease)     Hyperlipidemia     Obesity         Present Medications:         Current Outpatient Medications   Medication Sig Dispense Refill    losartan (COZAAR) 25 MG tablet Take 1 tablet by mouth daily 30 tablet 2    Ascorbic Acid (VITAMIN C) 1000 MG tablet Take 1 tablet by mouth daily      ibuprofen (ADVIL;MOTRIN) 200 MG tablet Take 4 tablets by mouth      omeprazole (PRILOSEC) 20 MG capsule TAKE ONE CAPSULE BY MOUTH EVERY DAY       No current facility-administered medications for this visit.         Allergies:      No Known Allergies        Review of Systems:    Pertinent items are noted in HPI         Vital Signs:    There were no vitals filed for this visit.     General Exam:     Constitutional: Patient is adequately groomed with no evidence of malnutrition    Physical Exam: Right Left      Primary Exam:    Inspection: No appreciable effusion      Skin: There are no rashes, ulcerations or lesions.      Gait: Nonantalgic      Neurovascular - non focal and intact       Additional Comments:        Additional Examinations:                    Office Imaging Results/Procedures PerformedToday:             Office Procedures:     No orders of the defined types were placed in this encounter.          Other Outside Imaging and Testing Personally Reviewed:    US GUIDED NEEDLE PLACEMENT    Result Date: 7/2/2024  Radiology result is complete; follow up with provider / physician office for radiology

## 2024-07-18 ENCOUNTER — APPOINTMENT (OUTPATIENT)
Dept: PHYSICAL THERAPY | Age: 74
End: 2024-07-18
Payer: MEDICARE

## 2024-09-16 RX ORDER — LOSARTAN POTASSIUM 25 MG/1
25 TABLET ORAL DAILY
Qty: 30 TABLET | Refills: 2 | OUTPATIENT
Start: 2024-09-16

## 2024-09-30 ENCOUNTER — OFFICE VISIT (OUTPATIENT)
Dept: FAMILY MEDICINE CLINIC | Age: 74
End: 2024-09-30
Payer: MEDICARE

## 2024-09-30 VITALS
TEMPERATURE: 98.3 F | SYSTOLIC BLOOD PRESSURE: 130 MMHG | OXYGEN SATURATION: 98 % | DIASTOLIC BLOOD PRESSURE: 62 MMHG | WEIGHT: 174 LBS | BODY MASS INDEX: 33.98 KG/M2 | HEART RATE: 81 BPM

## 2024-09-30 DIAGNOSIS — I10 PRIMARY HYPERTENSION: Primary | ICD-10-CM

## 2024-09-30 DIAGNOSIS — R73.03 PREDIABETES: ICD-10-CM

## 2024-09-30 DIAGNOSIS — M17.12 PRIMARY OSTEOARTHRITIS OF LEFT KNEE: ICD-10-CM

## 2024-09-30 DIAGNOSIS — E78.5 HYPERLIPIDEMIA, UNSPECIFIED HYPERLIPIDEMIA TYPE: ICD-10-CM

## 2024-09-30 DIAGNOSIS — M81.0 OSTEOPOROSIS, UNSPECIFIED OSTEOPOROSIS TYPE, UNSPECIFIED PATHOLOGICAL FRACTURE PRESENCE: ICD-10-CM

## 2024-09-30 PROCEDURE — 3078F DIAST BP <80 MM HG: CPT | Performed by: FAMILY MEDICINE

## 2024-09-30 PROCEDURE — 1123F ACP DISCUSS/DSCN MKR DOCD: CPT | Performed by: FAMILY MEDICINE

## 2024-09-30 PROCEDURE — 99214 OFFICE O/P EST MOD 30 MIN: CPT | Performed by: FAMILY MEDICINE

## 2024-09-30 PROCEDURE — 3075F SYST BP GE 130 - 139MM HG: CPT | Performed by: FAMILY MEDICINE

## 2024-09-30 RX ORDER — LOSARTAN POTASSIUM 25 MG/1
25 TABLET ORAL DAILY
Qty: 30 TABLET | Refills: 2 | Status: SHIPPED | OUTPATIENT
Start: 2024-09-30

## 2024-09-30 RX ORDER — LOSARTAN POTASSIUM 25 MG/1
25 TABLET ORAL DAILY
Qty: 30 TABLET | Refills: 2 | OUTPATIENT
Start: 2024-09-30

## 2024-09-30 ASSESSMENT — ENCOUNTER SYMPTOMS
ABDOMINAL PAIN: 0
BLOOD IN STOOL: 0
SHORTNESS OF BREATH: 0
DIARRHEA: 0
COUGH: 0
CONSTIPATION: 0
CHEST TIGHTNESS: 0

## 2024-09-30 NOTE — PROGRESS NOTES
SUBJECTIVE:  Barrett ROA Vanessa   1950   female   No Known Allergies    Chief Complaint   Patient presents with    Follow-up        Patient Active Problem List    Diagnosis Date Noted    Senile osteoporosis 01/18/2024    Displaced fracture of proximal phalanx of right little finger, initial encounter for closed fracture 07/28/2022    Basal cell carcinoma of left medial forehead 05/18/2016    Venous (peripheral) insufficiency 08/10/2009     Formatting of this note might be different from the original.   ICD-10 Transition  Formatting of this note might be different from the original.   Formatting of this note might be different from the original.   ICD-10 Transition      Esophageal reflux 06/19/2008    Insomnia 06/19/2008     Formatting of this note might be different from the original.   ICD-10 Transition  Formatting of this note might be different from the original.   Formatting of this note might be different from the original.   ICD-10 Transition      Other hyperlipidemia 06/19/2008       HPI   Patient is here today for follow-up on hypertension, prediabetes, hyperlipidemia, OA and osteoporosis.  Patient's had a recent DEXA scan which actually showed osteopenia.  She is not currently on any medications but is trying to stay up with daily vitamin D and calcium.  Patient continues to do well on current management for hypertension and hyperlipidemia.  Denies chest pain, shortness of breath or myalgias.  Denies headache change in vision or any neurologic symptoms.  Most recent labs cholesterol was elevated at 223 and  (up from 97).  She is not currently on any medications for hyperlipidemia.  Reports that she has been struggling with her weight and trying to lose weight.  She has been thinking about going back on weight watchers diet.  No GI or bowel complaints.  Patient is being treated by Ortho for OA of left knee.  Reports she has received the gel injections but she is having a lot of pain still with

## 2024-10-01 ENCOUNTER — OFFICE VISIT (OUTPATIENT)
Dept: ORTHOPEDIC SURGERY | Age: 74
End: 2024-10-01
Payer: MEDICARE

## 2024-10-01 VITALS — BODY MASS INDEX: 34.15 KG/M2 | WEIGHT: 173.94 LBS | HEIGHT: 60 IN

## 2024-10-01 DIAGNOSIS — I10 PRIMARY HYPERTENSION: ICD-10-CM

## 2024-10-01 DIAGNOSIS — M23.301 DEGENERATIVE TEAR OF LATERAL MENISCUS OF LEFT KNEE: ICD-10-CM

## 2024-10-01 DIAGNOSIS — M21.062 ACQUIRED VALGUS DEFORMITY KNEE, LEFT: ICD-10-CM

## 2024-10-01 DIAGNOSIS — E78.5 HYPERLIPIDEMIA, UNSPECIFIED HYPERLIPIDEMIA TYPE: ICD-10-CM

## 2024-10-01 DIAGNOSIS — M17.12 PRIMARY OSTEOARTHRITIS OF LEFT KNEE: Primary | ICD-10-CM

## 2024-10-01 PROCEDURE — 1123F ACP DISCUSS/DSCN MKR DOCD: CPT | Performed by: INTERNAL MEDICINE

## 2024-10-01 PROCEDURE — 99213 OFFICE O/P EST LOW 20 MIN: CPT | Performed by: INTERNAL MEDICINE

## 2024-10-01 NOTE — PROGRESS NOTES
Focal tenderness over the L JL with crepitation      Range of Motion: 0/125 tightness and pain in flexion      Strength: Normal with SLR      Special Tests: Grade 1 pseudo valgus instability, lateral collateral ligament stressing stable      Skin: There are no rashes, ulcerations or lesions.      Gait: Near normal      Neurovascular - non focal and intact  tive      Additional Comments:        Additional Examinations:                    Office Imaging Results/Procedures PerformedToday:             Office Procedures:     Orders Placed This Encounter   Procedures    Nixon Gordon MD, Orthopedic Primary Care Sports Medicine (Knee, Shoulder; Hip), [facility]     Referral Priority:   Routine     Referral Type:   Eval and Treat     Referral Reason:   Specialty Services Required     Referred to Provider:   Nixon Maya MD     Requested Specialty:   Orthopedic Surgery     Number of Visits Requested:   1           Other Outside Imaging and Testing Personally Reviewed:    No results found.           Assessment   Impression: .    Encounter Diagnoses   Name Primary?    Primary osteoarthritis of left knee Yes    Acquired valgus deformity knee, left     Degenerative tear of lateral meniscus of left knee               Plan:       Surgical consultation Dr. Saad Maya consideration for elective TKA  continue  IHEP  Activity modification - knee precautions and caution against overuse  Medical pain management: analgesic: OTC  Consider PRP-high-dose PRP type I Arthrex Salomón as an alternative       Orders:        Orders Placed This Encounter   Procedures    Nixon Gordon MD, Orthopedic Primary Care Sports Medicine (Knee, Shoulder; Hip), [facility]     Referral Priority:   Routine     Referral Type:   Eval and Treat     Referral Reason:   Specialty Services Required     Referred to Provider:   Nixon Maya MD     Requested Specialty:   Orthopedic Surgery     Number

## 2024-10-02 LAB
ALBUMIN SERPL-MCNC: 4.4 G/DL (ref 3.4–5)
ALBUMIN/GLOB SERPL: 1.9 {RATIO} (ref 1.1–2.2)
ALP SERPL-CCNC: 99 U/L (ref 40–129)
ALT SERPL-CCNC: 10 U/L (ref 10–40)
ANION GAP SERPL CALCULATED.3IONS-SCNC: 14 MMOL/L (ref 3–16)
AST SERPL-CCNC: 12 U/L (ref 15–37)
BASOPHILS # BLD: 0 K/UL (ref 0–0.2)
BASOPHILS NFR BLD: 0.2 %
BILIRUB SERPL-MCNC: <0.2 MG/DL (ref 0–1)
BUN SERPL-MCNC: 16 MG/DL (ref 7–20)
CALCIUM SERPL-MCNC: 9.7 MG/DL (ref 8.3–10.6)
CHLORIDE SERPL-SCNC: 102 MMOL/L (ref 99–110)
CHOLEST SERPL-MCNC: 237 MG/DL (ref 0–199)
CO2 SERPL-SCNC: 26 MMOL/L (ref 21–32)
CREAT SERPL-MCNC: 0.8 MG/DL (ref 0.6–1.2)
DEPRECATED RDW RBC AUTO: 15 % (ref 12.4–15.4)
EOSINOPHIL # BLD: 0.1 K/UL (ref 0–0.6)
EOSINOPHIL NFR BLD: 1.1 %
GFR SERPLBLD CREATININE-BSD FMLA CKD-EPI: 76 ML/MIN/{1.73_M2}
GLUCOSE SERPL-MCNC: 81 MG/DL (ref 70–99)
HCT VFR BLD AUTO: 40.3 % (ref 36–48)
HDLC SERPL-MCNC: 45 MG/DL (ref 40–60)
HGB BLD-MCNC: 13 G/DL (ref 12–16)
LDLC SERPL CALC-MCNC: ABNORMAL MG/DL
LDLC SERPL-MCNC: 120 MG/DL
LYMPHOCYTES # BLD: 2.8 K/UL (ref 1–5.1)
LYMPHOCYTES NFR BLD: 32.3 %
MCH RBC QN AUTO: 27.3 PG (ref 26–34)
MCHC RBC AUTO-ENTMCNC: 32.2 G/DL (ref 31–36)
MCV RBC AUTO: 84.9 FL (ref 80–100)
MONOCYTES # BLD: 0.5 K/UL (ref 0–1.3)
MONOCYTES NFR BLD: 6.1 %
NEUTROPHILS # BLD: 5.2 K/UL (ref 1.7–7.7)
NEUTROPHILS NFR BLD: 60.3 %
PLATELET # BLD AUTO: 297 K/UL (ref 135–450)
PMV BLD AUTO: 9 FL (ref 5–10.5)
POTASSIUM SERPL-SCNC: 4.3 MMOL/L (ref 3.5–5.1)
PROT SERPL-MCNC: 6.7 G/DL (ref 6.4–8.2)
RBC # BLD AUTO: 4.74 M/UL (ref 4–5.2)
SODIUM SERPL-SCNC: 142 MMOL/L (ref 136–145)
TRIGL SERPL-MCNC: 391 MG/DL (ref 0–150)
VLDLC SERPL CALC-MCNC: ABNORMAL MG/DL
WBC # BLD AUTO: 8.6 K/UL (ref 4–11)

## 2024-10-02 SDOH — HEALTH STABILITY: PHYSICAL HEALTH: ON AVERAGE, HOW MANY DAYS PER WEEK DO YOU ENGAGE IN MODERATE TO STRENUOUS EXERCISE (LIKE A BRISK WALK)?: 0 DAYS

## 2024-10-02 SDOH — HEALTH STABILITY: PHYSICAL HEALTH: ON AVERAGE, HOW MANY MINUTES DO YOU ENGAGE IN EXERCISE AT THIS LEVEL?: 0 MIN

## 2024-10-04 ENCOUNTER — OFFICE VISIT (OUTPATIENT)
Dept: ORTHOPEDIC SURGERY | Age: 74
End: 2024-10-04
Payer: MEDICARE

## 2024-10-04 VITALS — WEIGHT: 176.6 LBS | BODY MASS INDEX: 34.67 KG/M2 | HEIGHT: 60 IN

## 2024-10-04 DIAGNOSIS — M21.062 ACQUIRED VALGUS DEFORMITY KNEE, LEFT: ICD-10-CM

## 2024-10-04 DIAGNOSIS — M17.12 PRIMARY OSTEOARTHRITIS OF LEFT KNEE: Primary | ICD-10-CM

## 2024-10-04 PROCEDURE — 99214 OFFICE O/P EST MOD 30 MIN: CPT | Performed by: ORTHOPAEDIC SURGERY

## 2024-10-04 PROCEDURE — 1123F ACP DISCUSS/DSCN MKR DOCD: CPT | Performed by: ORTHOPAEDIC SURGERY

## 2024-10-04 NOTE — PROGRESS NOTES
(VITAMIN C) 1000 MG tablet Take 1 tablet by mouth daily      ibuprofen (ADVIL;MOTRIN) 200 MG tablet Take 4 tablets by mouth      omeprazole (PRILOSEC) 20 MG capsule TAKE ONE CAPSULE BY MOUTH EVERY DAY       No current facility-administered medications for this visit.      allergies, social and family histories were reviewed and updated as appropriate.    Review of Systems:  Relevant review of systems reviewed and available in the patient's chart and scanned in under the MEDIA tab today.    Vital Signs:  Ht 1.524 m (5')   Wt 80.1 kg (176 lb 9.6 oz)   BMI 34.49 kg/m²     General Exam:   Constitutional: She is adequately groomed with no evidence of malnutrition, obesity present  Mental Status: She is oriented to time, place and person. Normal mentation and affect for age.  Lymphatic: The lymphatic examination bilaterally reveals all areas to be without enlargement or induration.  Vascular: Examination reveals no swelling or calf tenderness.  Peripheral pulses are palpable and 2+.  Neurological: She has good coordination and balance.  There is no focal weakness or sensory deficit.    Left Knee Examination:    Inspection:  Knee shows mild valgus alignment  Normal muscle bulk and tone for her age and activity level.  No erythema or significant effusion.    Palpation: Moderate tenderness over the lateral joint line.  Mild tenderness medially and along the patellofemoral joint.  Palpable crepitation with range of motion.    Range of Motion: 5 to 115 with moderate pain at the endpoints of motion    Strength:  Quad 4+/ 5  ; Hamstrings 4+/ 5.  Gross motor to hip and ankle intact, no pain with logroll the hip.      Special Tests:     no posterior sag   no posterior drawer  Negative patellar grind.   does not open to valgus or varus stress at 0 or 30°, pseudolaxity to varus stress but firm endpoint.  negative Homans   Capillary refill is brisk sensation is intact.    Skin: There are no rashes, ulcerations or lesions.    Gait:

## 2024-10-08 ENCOUNTER — PREP FOR PROCEDURE (OUTPATIENT)
Age: 74
End: 2024-10-08

## 2024-10-08 DIAGNOSIS — Z01.818 PRE-OP TESTING: Primary | ICD-10-CM

## 2024-10-08 RX ORDER — SODIUM CHLORIDE 9 MG/ML
INJECTION, SOLUTION INTRAVENOUS CONTINUOUS
Status: CANCELLED | OUTPATIENT
Start: 2024-10-08

## 2024-10-08 RX ORDER — SODIUM CHLORIDE 0.9 % (FLUSH) 0.9 %
5-40 SYRINGE (ML) INJECTION PRN
Status: CANCELLED | OUTPATIENT
Start: 2024-10-08

## 2024-10-08 RX ORDER — TRANEXAMIC ACID 650 MG/1
1950 TABLET ORAL
Status: CANCELLED | OUTPATIENT
Start: 2024-10-08

## 2024-10-08 RX ORDER — ACETAMINOPHEN 325 MG/1
1000 TABLET ORAL ONCE
Status: CANCELLED | OUTPATIENT
Start: 2024-10-08 | End: 2024-10-08

## 2024-10-08 RX ORDER — MELOXICAM 7.5 MG/1
3.75 TABLET ORAL ONCE
Status: CANCELLED | OUTPATIENT
Start: 2024-10-08 | End: 2024-10-08

## 2024-10-08 RX ORDER — SODIUM CHLORIDE 9 MG/ML
INJECTION, SOLUTION INTRAVENOUS PRN
Status: CANCELLED | OUTPATIENT
Start: 2024-10-08

## 2024-10-08 RX ORDER — SODIUM CHLORIDE 0.9 % (FLUSH) 0.9 %
5-40 SYRINGE (ML) INJECTION EVERY 12 HOURS SCHEDULED
Status: CANCELLED | OUTPATIENT
Start: 2024-10-08

## 2024-10-17 ENCOUNTER — TELEPHONE (OUTPATIENT)
Dept: ORTHOPEDIC SURGERY | Age: 74
End: 2024-10-17

## 2024-10-17 NOTE — TELEPHONE ENCOUNTER
Patient called attempting to reach office for a return call.    Caller was advised that staff was currently in clinic and would address when they are available.      Turbinate surgery  04/12/2017    Active  SHAGUFTA  Tonsillectomy & adenoidectomy  04/12/2017    Active  SHAGUFTA

## 2024-10-17 NOTE — TELEPHONE ENCOUNTER
General Question     Subject: patient called and she would like for Max to give her a call back regarding her insurance for surgery. Please Advise.  Patient Barrett Mendez \"Audrey\"   Contact Number: [phone]

## 2024-10-23 ENCOUNTER — OFFICE VISIT (OUTPATIENT)
Dept: FAMILY MEDICINE CLINIC | Age: 74
End: 2024-10-23

## 2024-10-23 VITALS
TEMPERATURE: 98.4 F | OXYGEN SATURATION: 99 % | HEART RATE: 70 BPM | DIASTOLIC BLOOD PRESSURE: 82 MMHG | WEIGHT: 174 LBS | SYSTOLIC BLOOD PRESSURE: 130 MMHG | BODY MASS INDEX: 33.98 KG/M2

## 2024-10-23 DIAGNOSIS — E78.5 HYPERLIPIDEMIA, UNSPECIFIED HYPERLIPIDEMIA TYPE: ICD-10-CM

## 2024-10-23 DIAGNOSIS — Z01.818 PREOP EXAMINATION: Primary | ICD-10-CM

## 2024-10-23 DIAGNOSIS — M81.0 OSTEOPOROSIS, UNSPECIFIED OSTEOPOROSIS TYPE, UNSPECIFIED PATHOLOGICAL FRACTURE PRESENCE: ICD-10-CM

## 2024-10-23 DIAGNOSIS — R73.03 PREDIABETES: ICD-10-CM

## 2024-10-23 DIAGNOSIS — M17.12 PRIMARY OSTEOARTHRITIS OF LEFT KNEE: ICD-10-CM

## 2024-10-23 NOTE — H&P (VIEW-ONLY)
Preoperative Consultation      Barrett Mendez  YOB: 1950    Date of Service:  10/23/2024    Vitals:    10/23/24 1240   BP: 130/82   Pulse: 70   Temp: 98.4 °F (36.9 °C)   SpO2: 99%   Weight: 78.9 kg (174 lb)      Wt Readings from Last 2 Encounters:   10/23/24 78.9 kg (174 lb)   10/04/24 80.1 kg (176 lb 9.6 oz)     BP Readings from Last 3 Encounters:   10/23/24 130/82   09/30/24 130/62   07/02/24 138/68        Chief Complaint   Patient presents with    Pre-op Exam     No Known Allergies  No outpatient medications have been marked as taking for the 10/23/24 encounter (Office Visit) with David Zayas MD.       This patient presents to the office today for a preoperative consultation at the request of surgeon, Dr. Maya, who plans on performing left knee replacement on November 13 at [facility].  The current problem began 1 year ago, and symptoms have been worsening with time.  Conservative therapy:  yes with injections .    Planned anesthesia: General   Known anesthesia problems: None   Bleeding risk: No recent or remote history of abnormal bleeding  Personal or FH of DVT/PE: No    Patient objection to receiving blood products: No    Patient Active Problem List   Diagnosis    Basal cell carcinoma of left medial forehead    Senile osteoporosis    Displaced fracture of proximal phalanx of right little finger, initial encounter for closed fracture    Esophageal reflux    Insomnia    Other hyperlipidemia    Venous (peripheral) insufficiency    Primary osteoarthritis of left knee    Acquired valgus deformity knee, left       Past Medical History:   Diagnosis Date    GERD (gastroesophageal reflux disease)     Hyperlipidemia     Hypertension     Obesity      Past Surgical History:   Procedure Laterality Date    BREAST BIOPSY Right 2014    benign    MOHS SURGERY  05/18/2016    Left medial forehead    TUBAL LIGATION  1978     Family History   Problem Relation Age of Onset    Breast

## 2024-10-23 NOTE — PROGRESS NOTES
Preoperative Consultation      Barrett Mendez  YOB: 1950    Date of Service:  10/23/2024    Vitals:    10/23/24 1240   BP: 130/82   Pulse: 70   Temp: 98.4 °F (36.9 °C)   SpO2: 99%   Weight: 78.9 kg (174 lb)      Wt Readings from Last 2 Encounters:   10/23/24 78.9 kg (174 lb)   10/04/24 80.1 kg (176 lb 9.6 oz)     BP Readings from Last 3 Encounters:   10/23/24 130/82   09/30/24 130/62   07/02/24 138/68        Chief Complaint   Patient presents with    Pre-op Exam     No Known Allergies  No outpatient medications have been marked as taking for the 10/23/24 encounter (Office Visit) with David Zayas MD.       This patient presents to the office today for a preoperative consultation at the request of surgeon, Dr. Maya, who plans on performing left knee replacement on November 13 at [facility].  The current problem began 1 year ago, and symptoms have been worsening with time.  Conservative therapy:  yes with injections .    Planned anesthesia: General   Known anesthesia problems: None   Bleeding risk: No recent or remote history of abnormal bleeding  Personal or FH of DVT/PE: No    Patient objection to receiving blood products: No    Patient Active Problem List   Diagnosis    Basal cell carcinoma of left medial forehead    Senile osteoporosis    Displaced fracture of proximal phalanx of right little finger, initial encounter for closed fracture    Esophageal reflux    Insomnia    Other hyperlipidemia    Venous (peripheral) insufficiency    Primary osteoarthritis of left knee    Acquired valgus deformity knee, left       Past Medical History:   Diagnosis Date    GERD (gastroesophageal reflux disease)     Hyperlipidemia     Hypertension     Obesity      Past Surgical History:   Procedure Laterality Date    BREAST BIOPSY Right 2014    benign    MOHS SURGERY  05/18/2016    Left medial forehead    TUBAL LIGATION  1978     Family History   Problem Relation Age of Onset    Breast

## 2024-10-24 DIAGNOSIS — Z01.818 PRE-OP TESTING: ICD-10-CM

## 2024-10-24 LAB
25(OH)D3 SERPL-MCNC: 31.4 NG/ML
ALBUMIN SERPL-MCNC: 4.1 G/DL (ref 3.4–5)
ALBUMIN/GLOB SERPL: 2 {RATIO} (ref 1.1–2.2)
ALP SERPL-CCNC: 96 U/L (ref 40–129)
ALT SERPL-CCNC: 9 U/L (ref 10–40)
ANION GAP SERPL CALCULATED.3IONS-SCNC: 11 MMOL/L (ref 3–16)
APTT BLD: 29.7 SEC (ref 22.1–36.4)
AST SERPL-CCNC: 12 U/L (ref 15–37)
BASOPHILS # BLD: 0 K/UL (ref 0–0.2)
BASOPHILS NFR BLD: 0.3 %
BILIRUB SERPL-MCNC: <0.2 MG/DL (ref 0–1)
BUN SERPL-MCNC: 15 MG/DL (ref 7–20)
CALCIUM SERPL-MCNC: 8.9 MG/DL (ref 8.3–10.6)
CHLORIDE SERPL-SCNC: 104 MMOL/L (ref 99–110)
CO2 SERPL-SCNC: 25 MMOL/L (ref 21–32)
CREAT SERPL-MCNC: 0.6 MG/DL (ref 0.6–1.2)
DEPRECATED RDW RBC AUTO: 14.9 % (ref 12.4–15.4)
EOSINOPHIL # BLD: 0.1 K/UL (ref 0–0.6)
EOSINOPHIL NFR BLD: 1.6 %
GFR SERPLBLD CREATININE-BSD FMLA CKD-EPI: >90 ML/MIN/{1.73_M2}
GLUCOSE SERPL-MCNC: 113 MG/DL (ref 70–99)
HCT VFR BLD AUTO: 38.1 % (ref 36–48)
HGB BLD-MCNC: 12.8 G/DL (ref 12–16)
INR PPP: 0.92 (ref 0.85–1.15)
LYMPHOCYTES # BLD: 2.4 K/UL (ref 1–5.1)
LYMPHOCYTES NFR BLD: 35.6 %
MCH RBC QN AUTO: 28.3 PG (ref 26–34)
MCHC RBC AUTO-ENTMCNC: 33.6 G/DL (ref 31–36)
MCV RBC AUTO: 84.2 FL (ref 80–100)
MONOCYTES # BLD: 0.4 K/UL (ref 0–1.3)
MONOCYTES NFR BLD: 6 %
NEUTROPHILS # BLD: 3.8 K/UL (ref 1.7–7.7)
NEUTROPHILS NFR BLD: 56.5 %
PLATELET # BLD AUTO: 265 K/UL (ref 135–450)
PMV BLD AUTO: 9.2 FL (ref 5–10.5)
POTASSIUM SERPL-SCNC: 4.2 MMOL/L (ref 3.5–5.1)
PROT SERPL-MCNC: 6.2 G/DL (ref 6.4–8.2)
PROTHROMBIN TIME: 12.6 SEC (ref 11.9–14.9)
RBC # BLD AUTO: 4.53 M/UL (ref 4–5.2)
SODIUM SERPL-SCNC: 140 MMOL/L (ref 136–145)
WBC # BLD AUTO: 6.8 K/UL (ref 4–11)

## 2024-10-25 LAB
ABO + RH BLD: NORMAL
BACTERIA UR CULT: NORMAL
BLD GP AB SCN SERPL QL: NORMAL
BLOOD GROUP ANTIBODIES SERPL: NORMAL
DAT IGG CAPTURE: NORMAL
EST. AVERAGE GLUCOSE BLD GHB EST-MCNC: 116.9 MG/DL
HBA1C MFR BLD: 5.7 %
JKA ANTIGEN: NORMAL

## 2024-10-27 LAB — MRSA SPEC QL CULT: NORMAL

## 2024-11-07 NOTE — DISCHARGE INSTR - COC
Continuity of Care Form    Patient Name: Barrett Mendez   :  1950  MRN:  [id number]    Admit date:  (Not on file)  Discharge date:  ***    Code Status Order: No Order   Advance Directives:   Advance Care Flowsheet Documentation             Admitting Physician:  Nixon Maya MD  PCP: David Zayas MD    Discharging Nurse: ***  Discharging Hospital Unit/Room#: No information available for this encounter.  Discharging Unit Phone Number: ***    Emergency Contact:   Extended Emergency Contact Information  Primary Emergency Contact: [place] [address] [facility]  Home Phone: [phone]  Mobile Phone: [phone]  Relation: Child    Past Surgical History:  Past Surgical History:   Procedure Laterality Date    BREAST BIOPSY Right 2014    benign    MOHS SURGERY  2016    Left medial forehead    TUBAL LIGATION         Immunization History:   Immunization History   Administered Date(s) Administered    COVID-19, MODERNA BLUE border, Primary or Immunocompromised, (age 12y+), IM, 100 mcg/0.5mL 2021, 08/10/2022    COVID-19, MODERNA, (age 12y+), IM, 50mcg/0.5mL 2024, 2024    COVID-19, PFIZER Bivalent, DO NOT Dilute, (age 12y+), IM, 30 mcg/0.3 mL 2022    COVID-19, PFIZER PURPLE top, DILUTE for use, (age 12 y+), 30mcg/0.3mL 2021, 2021, 2022, 10/07/2023    Influenza Vaccine, unspecified formulation 10/08/2012    Influenza Virus Vaccine 10/13/2017    Influenza, FLUAD, (age 65 y+), IM, Quadv, 0.5mL 2020    Influenza, FLUAD, (age 65 y+), IM, Trivalent PF, 0.5mL 2018    Influenza, FLUZONE High Dose (age 65 y+), IM, Quadv, 0.7mL 2021, 2022, 10/07/2023    Pneumococcal Conjugate Vaccine 2010    Pneumococcal, PCV-13, PREVNAR 13, (age 6w+), IM, 0.5mL 2018    Pneumococcal, PPSV23, PNEUMOVAX 23, (age 2y+), SC/IM, 0.5mL 2017    TD 2LF, TDVAX, (age 7y+), IM, 0.5mL 03/15/2022    TDaP, ADACEL

## 2024-11-12 RX ORDER — TRANEXAMIC ACID 650 MG/1
1950 TABLET ORAL
Status: DISCONTINUED | OUTPATIENT
Start: 2024-11-13 | End: 2024-11-13 | Stop reason: HOSPADM

## 2024-11-12 RX ORDER — DEXAMETHASONE SODIUM PHOSPHATE 10 MG/ML
8 INJECTION, SOLUTION INTRAMUSCULAR; INTRAVENOUS ONCE
Status: COMPLETED | OUTPATIENT
Start: 2024-11-13 | End: 2024-11-13

## 2024-11-13 ENCOUNTER — ANESTHESIA (OUTPATIENT)
Dept: OPERATING ROOM | Age: 74
End: 2024-11-13
Payer: MEDICARE

## 2024-11-13 ENCOUNTER — APPOINTMENT (OUTPATIENT)
Dept: GENERAL RADIOLOGY | Age: 74
End: 2024-11-13
Attending: ORTHOPAEDIC SURGERY
Payer: MEDICARE

## 2024-11-13 ENCOUNTER — HOSPITAL ENCOUNTER (OUTPATIENT)
Age: 74
Setting detail: OUTPATIENT SURGERY
Discharge: HOME | End: 2024-11-13
Attending: ORTHOPAEDIC SURGERY | Admitting: ORTHOPAEDIC SURGERY
Payer: MEDICARE

## 2024-11-13 ENCOUNTER — ANESTHESIA EVENT (OUTPATIENT)
Dept: OPERATING ROOM | Age: 74
End: 2024-11-13
Payer: MEDICARE

## 2024-11-13 VITALS
SYSTOLIC BLOOD PRESSURE: 159 MMHG | WEIGHT: 174 LBS | OXYGEN SATURATION: 99 % | HEIGHT: 60 IN | RESPIRATION RATE: 11 BRPM | DIASTOLIC BLOOD PRESSURE: 68 MMHG | BODY MASS INDEX: 34.16 KG/M2 | HEART RATE: 78 BPM | TEMPERATURE: 98.6 F

## 2024-11-13 DIAGNOSIS — M17.12 PRIMARY OSTEOARTHRITIS OF LEFT KNEE: Primary | ICD-10-CM

## 2024-11-13 LAB
ABO + RH BLD: NORMAL
BLD GP AB SCN SERPL QL: NORMAL
BLOOD GROUP ANTIBODIES SERPL: NORMAL
DAT IGG CAPTURE: NORMAL
GLUCOSE BLD-MCNC: 91 MG/DL (ref 70–99)
PERFORMED ON: NORMAL

## 2024-11-13 PROCEDURE — 73560 X-RAY EXAM OF KNEE 1 OR 2: CPT

## 2024-11-13 PROCEDURE — 6360000002 HC RX W HCPCS: Performed by: ANESTHESIOLOGY

## 2024-11-13 PROCEDURE — 7100000011 HC PHASE II RECOVERY - ADDTL 15 MIN: Performed by: ORTHOPAEDIC SURGERY

## 2024-11-13 PROCEDURE — 86901 BLOOD TYPING SEROLOGIC RH(D): CPT

## 2024-11-13 PROCEDURE — 86880 COOMBS TEST DIRECT: CPT

## 2024-11-13 PROCEDURE — 3700000001 HC ADD 15 MINUTES (ANESTHESIA): Performed by: ORTHOPAEDIC SURGERY

## 2024-11-13 PROCEDURE — APPNB45 APP NON BILLABLE 31-45 MINUTES: Performed by: NURSE PRACTITIONER

## 2024-11-13 PROCEDURE — 86900 BLOOD TYPING SEROLOGIC ABO: CPT

## 2024-11-13 PROCEDURE — 86850 RBC ANTIBODY SCREEN: CPT

## 2024-11-13 PROCEDURE — 86922 COMPATIBILITY TEST ANTIGLOB: CPT

## 2024-11-13 PROCEDURE — 97165 OT EVAL LOW COMPLEX 30 MIN: CPT

## 2024-11-13 PROCEDURE — 2500000003 HC RX 250 WO HCPCS: Performed by: ORTHOPAEDIC SURGERY

## 2024-11-13 PROCEDURE — 2720000010 HC SURG SUPPLY STERILE: Performed by: ORTHOPAEDIC SURGERY

## 2024-11-13 PROCEDURE — 3700000000 HC ANESTHESIA ATTENDED CARE: Performed by: ORTHOPAEDIC SURGERY

## 2024-11-13 PROCEDURE — 6360000002 HC RX W HCPCS: Performed by: REGISTERED NURSE

## 2024-11-13 PROCEDURE — 86902 BLOOD TYPE ANTIGEN DONOR EA: CPT

## 2024-11-13 PROCEDURE — 6370000000 HC RX 637 (ALT 250 FOR IP): Performed by: ORTHOPAEDIC SURGERY

## 2024-11-13 PROCEDURE — 86870 RBC ANTIBODY IDENTIFICATION: CPT

## 2024-11-13 PROCEDURE — C1713 ANCHOR/SCREW BN/BN,TIS/BN: HCPCS | Performed by: ORTHOPAEDIC SURGERY

## 2024-11-13 PROCEDURE — 6360000002 HC RX W HCPCS: Performed by: ORTHOPAEDIC SURGERY

## 2024-11-13 PROCEDURE — 2580000003 HC RX 258: Performed by: REGISTERED NURSE

## 2024-11-13 PROCEDURE — 2580000003 HC RX 258: Performed by: ORTHOPAEDIC SURGERY

## 2024-11-13 PROCEDURE — 7100000010 HC PHASE II RECOVERY - FIRST 15 MIN: Performed by: ORTHOPAEDIC SURGERY

## 2024-11-13 PROCEDURE — 2709999900 HC NON-CHARGEABLE SUPPLY: Performed by: ORTHOPAEDIC SURGERY

## 2024-11-13 PROCEDURE — 36415 COLL VENOUS BLD VENIPUNCTURE: CPT

## 2024-11-13 PROCEDURE — 2500000003 HC RX 250 WO HCPCS: Performed by: REGISTERED NURSE

## 2024-11-13 PROCEDURE — C1776 JOINT DEVICE (IMPLANTABLE): HCPCS | Performed by: ORTHOPAEDIC SURGERY

## 2024-11-13 PROCEDURE — 7100000000 HC PACU RECOVERY - FIRST 15 MIN: Performed by: ORTHOPAEDIC SURGERY

## 2024-11-13 PROCEDURE — 97161 PT EVAL LOW COMPLEX 20 MIN: CPT

## 2024-11-13 PROCEDURE — 7100000001 HC PACU RECOVERY - ADDTL 15 MIN: Performed by: ORTHOPAEDIC SURGERY

## 2024-11-13 PROCEDURE — 97535 SELF CARE MNGMENT TRAINING: CPT

## 2024-11-13 PROCEDURE — 3600000004 HC SURGERY LEVEL 4 BASE: Performed by: ORTHOPAEDIC SURGERY

## 2024-11-13 PROCEDURE — 3600000014 HC SURGERY LEVEL 4 ADDTL 15MIN: Performed by: ORTHOPAEDIC SURGERY

## 2024-11-13 PROCEDURE — 64447 NJX AA&/STRD FEMORAL NRV IMG: CPT | Performed by: ANESTHESIOLOGY

## 2024-11-13 DEVICE — COMPONENT PAT DIA32MM THK8.5MM STD KNEE VIVACIT-E CEM: Type: IMPLANTABLE DEVICE | Site: KNEE | Status: FUNCTIONAL

## 2024-11-13 DEVICE — CEMENT BNE 40GM HI VISC RADPQ FOR REV SURG: Type: IMPLANTABLE DEVICE | Site: KNEE | Status: FUNCTIONAL

## 2024-11-13 DEVICE — DUP USE 333565 IMPL KNEE PSN TIB STM 5 DEG SZ D L: Type: IMPLANTABLE DEVICE | Site: KNEE | Status: FUNCTIONAL

## 2024-11-13 DEVICE — PSN MC VE ASF L 12MM 4-5/CD: Type: IMPLANTABLE DEVICE | Site: KNEE | Status: FUNCTIONAL

## 2024-11-13 DEVICE — IMPLANTABLE DEVICE: Type: IMPLANTABLE DEVICE | Site: KNEE | Status: FUNCTIONAL

## 2024-11-13 RX ORDER — MAGNESIUM SULFATE HEPTAHYDRATE 500 MG/ML
INJECTION, SOLUTION INTRAMUSCULAR; INTRAVENOUS
Status: DISCONTINUED | OUTPATIENT
Start: 2024-11-13 | End: 2024-11-13 | Stop reason: SDUPTHER

## 2024-11-13 RX ORDER — IBUPROFEN 200 MG
400 TABLET ORAL EVERY 8 HOURS PRN
Qty: 120 TABLET | Refills: 3 | Status: SHIPPED | OUTPATIENT
Start: 2024-11-13

## 2024-11-13 RX ORDER — SUCCINYLCHOLINE CHLORIDE 20 MG/ML
INJECTION INTRAMUSCULAR; INTRAVENOUS
Status: DISCONTINUED | OUTPATIENT
Start: 2024-11-13 | End: 2024-11-13 | Stop reason: SDUPTHER

## 2024-11-13 RX ORDER — ACETAMINOPHEN 500 MG
1000 TABLET ORAL ONCE
Status: COMPLETED | OUTPATIENT
Start: 2024-11-13 | End: 2024-11-13

## 2024-11-13 RX ORDER — PROPOFOL 10 MG/ML
INJECTION, EMULSION INTRAVENOUS
Status: DISCONTINUED | OUTPATIENT
Start: 2024-11-13 | End: 2024-11-13 | Stop reason: SDUPTHER

## 2024-11-13 RX ORDER — NALOXONE HYDROCHLORIDE 0.4 MG/ML
INJECTION, SOLUTION INTRAMUSCULAR; INTRAVENOUS; SUBCUTANEOUS PRN
Status: DISCONTINUED | OUTPATIENT
Start: 2024-11-13 | End: 2024-11-13 | Stop reason: HOSPADM

## 2024-11-13 RX ORDER — SODIUM CHLORIDE 0.9 % (FLUSH) 0.9 %
5-40 SYRINGE (ML) INJECTION PRN
Status: DISCONTINUED | OUTPATIENT
Start: 2024-11-13 | End: 2024-11-13 | Stop reason: HOSPADM

## 2024-11-13 RX ORDER — SODIUM CHLORIDE 9 MG/ML
INJECTION, SOLUTION INTRAVENOUS PRN
Status: DISCONTINUED | OUTPATIENT
Start: 2024-11-13 | End: 2024-11-13 | Stop reason: HOSPADM

## 2024-11-13 RX ORDER — ASPIRIN 325 MG
325 TABLET ORAL DAILY
Qty: 30 TABLET | Refills: 0 | Status: SHIPPED | OUTPATIENT
Start: 2024-11-13 | End: 2024-12-13

## 2024-11-13 RX ORDER — IPRATROPIUM BROMIDE AND ALBUTEROL SULFATE 2.5; .5 MG/3ML; MG/3ML
1 SOLUTION RESPIRATORY (INHALATION)
Status: DISCONTINUED | OUTPATIENT
Start: 2024-11-13 | End: 2024-11-13 | Stop reason: HOSPADM

## 2024-11-13 RX ORDER — DIPHENHYDRAMINE HYDROCHLORIDE 50 MG/ML
12.5 INJECTION INTRAMUSCULAR; INTRAVENOUS
Status: DISCONTINUED | OUTPATIENT
Start: 2024-11-13 | End: 2024-11-13 | Stop reason: HOSPADM

## 2024-11-13 RX ORDER — PROCHLORPERAZINE EDISYLATE 5 MG/ML
5 INJECTION INTRAMUSCULAR; INTRAVENOUS
Status: DISCONTINUED | OUTPATIENT
Start: 2024-11-13 | End: 2024-11-13 | Stop reason: HOSPADM

## 2024-11-13 RX ORDER — OXYCODONE HYDROCHLORIDE 5 MG/1
5 TABLET ORAL
Status: DISCONTINUED | OUTPATIENT
Start: 2024-11-13 | End: 2024-11-13 | Stop reason: HOSPADM

## 2024-11-13 RX ORDER — BUPIVACAINE HYDROCHLORIDE 5 MG/ML
INJECTION, SOLUTION EPIDURAL; INTRACAUDAL
Status: COMPLETED | OUTPATIENT
Start: 2024-11-13 | End: 2024-11-13

## 2024-11-13 RX ORDER — FENTANYL CITRATE 50 UG/ML
INJECTION, SOLUTION INTRAMUSCULAR; INTRAVENOUS
Status: DISCONTINUED | OUTPATIENT
Start: 2024-11-13 | End: 2024-11-13 | Stop reason: SDUPTHER

## 2024-11-13 RX ORDER — VANCOMYCIN HYDROCHLORIDE 1 G/20ML
INJECTION, POWDER, LYOPHILIZED, FOR SOLUTION INTRAVENOUS
Status: COMPLETED | OUTPATIENT
Start: 2024-11-13 | End: 2024-11-13

## 2024-11-13 RX ORDER — ONDANSETRON 2 MG/ML
4 INJECTION INTRAMUSCULAR; INTRAVENOUS
Status: DISCONTINUED | OUTPATIENT
Start: 2024-11-13 | End: 2024-11-13 | Stop reason: HOSPADM

## 2024-11-13 RX ORDER — HYDROMORPHONE HYDROCHLORIDE 2 MG/ML
0.5 INJECTION, SOLUTION INTRAMUSCULAR; INTRAVENOUS; SUBCUTANEOUS EVERY 5 MIN PRN
Status: DISCONTINUED | OUTPATIENT
Start: 2024-11-13 | End: 2024-11-13 | Stop reason: HOSPADM

## 2024-11-13 RX ORDER — LORAZEPAM 2 MG/ML
0.5 INJECTION INTRAMUSCULAR
Status: DISCONTINUED | OUTPATIENT
Start: 2024-11-13 | End: 2024-11-13 | Stop reason: HOSPADM

## 2024-11-13 RX ORDER — DOCUSATE SODIUM 100 MG/1
100 CAPSULE, LIQUID FILLED ORAL 2 TIMES DAILY
Qty: 60 CAPSULE | Refills: 0 | Status: SHIPPED | OUTPATIENT
Start: 2024-11-13 | End: 2024-12-13

## 2024-11-13 RX ORDER — ROCURONIUM BROMIDE 10 MG/ML
INJECTION, SOLUTION INTRAVENOUS
Status: DISCONTINUED | OUTPATIENT
Start: 2024-11-13 | End: 2024-11-13 | Stop reason: SDUPTHER

## 2024-11-13 RX ORDER — BUPIVACAINE HYDROCHLORIDE AND EPINEPHRINE 2.5; 5 MG/ML; UG/ML
INJECTION, SOLUTION EPIDURAL; INFILTRATION; INTRACAUDAL; PERINEURAL
Status: COMPLETED | OUTPATIENT
Start: 2024-11-13 | End: 2024-11-13

## 2024-11-13 RX ORDER — LIDOCAINE HYDROCHLORIDE 20 MG/ML
INJECTION, SOLUTION EPIDURAL; INFILTRATION; INTRACAUDAL; PERINEURAL
Status: DISCONTINUED | OUTPATIENT
Start: 2024-11-13 | End: 2024-11-13 | Stop reason: SDUPTHER

## 2024-11-13 RX ORDER — SODIUM CHLORIDE 0.9 % (FLUSH) 0.9 %
5-40 SYRINGE (ML) INJECTION EVERY 12 HOURS SCHEDULED
Status: DISCONTINUED | OUTPATIENT
Start: 2024-11-13 | End: 2024-11-13 | Stop reason: HOSPADM

## 2024-11-13 RX ORDER — SODIUM CHLORIDE 9 MG/ML
INJECTION, SOLUTION INTRAVENOUS
Status: DISCONTINUED | OUTPATIENT
Start: 2024-11-13 | End: 2024-11-13 | Stop reason: SDUPTHER

## 2024-11-13 RX ORDER — FENTANYL CITRATE 50 UG/ML
25 INJECTION, SOLUTION INTRAMUSCULAR; INTRAVENOUS EVERY 5 MIN PRN
Status: DISCONTINUED | OUTPATIENT
Start: 2024-11-13 | End: 2024-11-13 | Stop reason: HOSPADM

## 2024-11-13 RX ORDER — LABETALOL HYDROCHLORIDE 5 MG/ML
10 INJECTION, SOLUTION INTRAVENOUS
Status: DISCONTINUED | OUTPATIENT
Start: 2024-11-13 | End: 2024-11-13 | Stop reason: HOSPADM

## 2024-11-13 RX ORDER — DEXAMETHASONE SODIUM PHOSPHATE 4 MG/ML
INJECTION, SOLUTION INTRA-ARTICULAR; INTRALESIONAL; INTRAMUSCULAR; INTRAVENOUS; SOFT TISSUE
Status: DISCONTINUED | OUTPATIENT
Start: 2024-11-13 | End: 2024-11-13 | Stop reason: SDUPTHER

## 2024-11-13 RX ORDER — HYDROMORPHONE HYDROCHLORIDE 2 MG/ML
INJECTION, SOLUTION INTRAMUSCULAR; INTRAVENOUS; SUBCUTANEOUS
Status: DISCONTINUED | OUTPATIENT
Start: 2024-11-13 | End: 2024-11-13 | Stop reason: SDUPTHER

## 2024-11-13 RX ORDER — ONDANSETRON 2 MG/ML
INJECTION INTRAMUSCULAR; INTRAVENOUS
Status: DISCONTINUED | OUTPATIENT
Start: 2024-11-13 | End: 2024-11-13 | Stop reason: SDUPTHER

## 2024-11-13 RX ORDER — SODIUM CHLORIDE 9 MG/ML
INJECTION, SOLUTION INTRAVENOUS CONTINUOUS
Status: DISCONTINUED | OUTPATIENT
Start: 2024-11-13 | End: 2024-11-13 | Stop reason: HOSPADM

## 2024-11-13 RX ORDER — TRANEXAMIC ACID 10 MG/ML
1000 INJECTION, SOLUTION INTRAVENOUS ONCE
Status: DISCONTINUED | OUTPATIENT
Start: 2024-11-13 | End: 2024-11-13 | Stop reason: HOSPADM

## 2024-11-13 RX ORDER — PROMETHAZINE HYDROCHLORIDE 25 MG/1
25 TABLET ORAL EVERY 6 HOURS PRN
Qty: 15 TABLET | Refills: 0 | Status: SHIPPED | OUTPATIENT
Start: 2024-11-13 | End: 2024-11-20

## 2024-11-13 RX ORDER — MELOXICAM 7.5 MG/1
3.75 TABLET ORAL ONCE
Status: COMPLETED | OUTPATIENT
Start: 2024-11-13 | End: 2024-11-13

## 2024-11-13 RX ORDER — OXYCODONE AND ACETAMINOPHEN 5; 325 MG/1; MG/1
1-2 TABLET ORAL EVERY 6 HOURS PRN
Qty: 56 TABLET | Refills: 0 | Status: SHIPPED | OUTPATIENT
Start: 2024-11-13 | End: 2024-11-20

## 2024-11-13 RX ADMIN — PROPOFOL 30 MG: 10 INJECTION, EMULSION INTRAVENOUS at 10:25

## 2024-11-13 RX ADMIN — DEXAMETHASONE SODIUM PHOSPHATE 8 MG: 10 INJECTION INTRAMUSCULAR; INTRAVENOUS at 10:11

## 2024-11-13 RX ADMIN — SODIUM CHLORIDE: 9 INJECTION, SOLUTION INTRAVENOUS at 10:33

## 2024-11-13 RX ADMIN — HYDROMORPHONE HYDROCHLORIDE 0.5 MG: 2 INJECTION, SOLUTION INTRAMUSCULAR; INTRAVENOUS; SUBCUTANEOUS at 12:38

## 2024-11-13 RX ADMIN — SUGAMMADEX 200 MG: 100 INJECTION, SOLUTION INTRAVENOUS at 12:16

## 2024-11-13 RX ADMIN — ROCURONIUM BROMIDE 50 MG: 10 INJECTION, SOLUTION INTRAVENOUS at 10:56

## 2024-11-13 RX ADMIN — ONDANSETRON 4 MG: 2 INJECTION INTRAMUSCULAR; INTRAVENOUS at 10:56

## 2024-11-13 RX ADMIN — LIDOCAINE HYDROCHLORIDE 100 MG: 20 INJECTION, SOLUTION EPIDURAL; INFILTRATION; INTRACAUDAL; PERINEURAL at 10:47

## 2024-11-13 RX ADMIN — FENTANYL CITRATE 50 MCG: 50 INJECTION, SOLUTION INTRAMUSCULAR; INTRAVENOUS at 10:47

## 2024-11-13 RX ADMIN — ACETAMINOPHEN 1000 MG: 500 TABLET ORAL at 10:10

## 2024-11-13 RX ADMIN — BUPIVACAINE HYDROCHLORIDE 30 ML: 5 INJECTION, SOLUTION EPIDURAL; INTRACAUDAL at 10:25

## 2024-11-13 RX ADMIN — WATER 2000 MG: 1 INJECTION INTRAMUSCULAR; INTRAVENOUS; SUBCUTANEOUS at 14:57

## 2024-11-13 RX ADMIN — CEFAZOLIN 2000 MG: 2 INJECTION, POWDER, FOR SOLUTION INTRAMUSCULAR; INTRAVENOUS at 10:52

## 2024-11-13 RX ADMIN — Medication 10 MG: at 11:10

## 2024-11-13 RX ADMIN — MELOXICAM 3.75 MG: 7.5 TABLET ORAL at 10:11

## 2024-11-13 RX ADMIN — PROPOFOL 150 MG: 10 INJECTION, EMULSION INTRAVENOUS at 10:47

## 2024-11-13 RX ADMIN — MAGNESIUM SULFATE HEPTAHYDRATE 1 G: 500 INJECTION, SOLUTION INTRAMUSCULAR; INTRAVENOUS at 10:56

## 2024-11-13 RX ADMIN — SODIUM CHLORIDE: 9 INJECTION, SOLUTION INTRAVENOUS at 10:43

## 2024-11-13 RX ADMIN — HYDROMORPHONE HYDROCHLORIDE 1 MG: 2 INJECTION, SOLUTION INTRAMUSCULAR; INTRAVENOUS; SUBCUTANEOUS at 11:13

## 2024-11-13 RX ADMIN — HYDROMORPHONE HYDROCHLORIDE 0.5 MG: 2 INJECTION, SOLUTION INTRAMUSCULAR; INTRAVENOUS; SUBCUTANEOUS at 13:15

## 2024-11-13 RX ADMIN — DEXAMETHASONE SODIUM PHOSPHATE 4 MG: 4 INJECTION, SOLUTION INTRAMUSCULAR; INTRAVENOUS at 10:56

## 2024-11-13 RX ADMIN — FENTANYL CITRATE 50 MCG: 50 INJECTION, SOLUTION INTRAMUSCULAR; INTRAVENOUS at 11:09

## 2024-11-13 RX ADMIN — HYDROMORPHONE HYDROCHLORIDE 0.5 MG: 2 INJECTION, SOLUTION INTRAMUSCULAR; INTRAVENOUS; SUBCUTANEOUS at 12:44

## 2024-11-13 RX ADMIN — SUCCINYLCHOLINE CHLORIDE 120 MG: 20 INJECTION, SOLUTION INTRAMUSCULAR; INTRAVENOUS at 10:47

## 2024-11-13 RX ADMIN — TRANEXAMIC ACID 1950 MG: 650 TABLET ORAL at 10:11

## 2024-11-13 RX ADMIN — Medication 40 MG: at 10:59

## 2024-11-13 ASSESSMENT — PAIN SCALES - GENERAL
PAINLEVEL_OUTOF10: 3
PAINLEVEL_OUTOF10: 7

## 2024-11-13 ASSESSMENT — PAIN DESCRIPTION - ORIENTATION: ORIENTATION: LEFT

## 2024-11-13 NOTE — ANESTHESIA PROCEDURE NOTES
Peripheral Block    Patient location during procedure: pre-op  Reason for block: post-op pain management and at surgeon's request  Start time: 11/13/2024 10:25 AM  End time: 11/13/2024 10:29 AM  Staffing  Performed: anesthesiologist   Anesthesiologist: Hugh Velazquez MD  Performed by: Hugh Velazquez MD  Authorized by: Hugh Velazquez MD    Preanesthetic Checklist  Completed: patient identified, IV checked, site marked, risks and benefits discussed, surgical/procedural consents, equipment checked, pre-op evaluation, timeout performed, anesthesia consent given, oxygen available, monitors applied/VS acknowledged, fire risk safety assessment completed and verbalized and blood product R/B/A discussed and consented  Peripheral Block   Patient position: supine  Prep: ChloraPrep  Provider prep: mask  Patient monitoring: cardiac monitor, continuous pulse ox, frequent blood pressure checks, IV access, oxygen and responsive to questions  Block type: Femoral  Adductor canal (Low Femoral)  Laterality: left  Injection technique: single-shot  Guidance: ultrasound guided  Local infiltration: lidocaine  Infiltration strength: 1 %  Local infiltration: lidocaine  Dose: 3 mL    Needle   Needle type: insulated echogenic nerve stimulator needle   Needle gauge: 21 G  Needle localization: ultrasound guidance  Needle length: 10 cm  Assessment   Injection assessment: negative aspiration for heme, no paresthesia on injection and local visualized surrounding nerve on ultrasound  Paresthesia pain: immediately resolved  Slow fractionated injection: yes  Hemodynamics: stable  Outcomes: uncomplicated    Additional Notes  Time out 1024  30 ml 0.5% bupi             Medications Administered  BUPivacaine (MARCAINE) PF injection 0.5% - Perineural   30 mL - 11/13/2024 10:25:00 AM

## 2024-11-13 NOTE — ANESTHESIA PRE PROCEDURE
Department of Anesthesiology  Preprocedure Note       Name:  Barrett Mendez   Age:  73 y.o.  :  1950                                          MRN:  [id number]         Date:  2024      Surgeon: Surgeon(s):  Nixon Maya MD    Procedure: Procedure(s):  ROBOTIC ASSISTED LEFT KNEE TOTAL ARTHROPLASTY -ADDUCTOR BLOCK; WESTON    Medications prior to admission:   Prior to Admission medications    Medication Sig Start Date End Date Taking? Authorizing Provider   losartan (COZAAR) 25 MG tablet Take 1 tablet by mouth daily 24   David Zayas MD   Ascorbic Acid (VITAMIN C) 1000 MG tablet Take 1 tablet by mouth daily    Karthik Varela MD   ibuprofen (ADVIL;MOTRIN) 200 MG tablet Take 4 tablets by mouth    Karthik Varela MD   omeprazole (PRILOSEC) 20 MG capsule TAKE ONE CAPSULE BY MOUTH EVERY DAY 13   Karthik Varela MD       Current medications:    Current Facility-Administered Medications   Medication Dose Route Frequency Provider Last Rate Last Admin   • acetaminophen (TYLENOL) tablet 1,000 mg  1,000 mg Oral Once Nixon Maya MD       • meloxicam (MOBIC) tablet 3.75 mg  3.75 mg Oral Once Nixon Maya MD       • 0.9 % sodium chloride infusion   IntraVENous Continuous Nixon Maya MD       • sodium chloride flush 0.9 % injection 5-40 mL  5-40 mL IntraVENous 2 times per day Nixon Maya MD       • sodium chloride flush 0.9 % injection 5-40 mL  5-40 mL IntraVENous PRN Nixon Maya MD       • 0.9 % sodium chloride infusion   IntraVENous PRN Nixon Maya MD       • ortho mix injection   Injection On Call Nixon Maya MD       • dexAMETHasone (PF) (DECADRON) injection 8 mg  8 mg IntraVENous Once Nixon Maya MD       • tranexamic acid (LYSTEDA) tablet 1,950 mg  1,950 mg Oral 60 Min Pre-Op Nixon Maya MD       • ceFAZolin (ANCEF) 2,000 mg in sterile water 20 mL IV syringe  2,000 mg

## 2024-11-13 NOTE — ANESTHESIA POSTPROCEDURE EVALUATION
Department of Anesthesiology  Postprocedure Note    Patient: Barrett Mendez  MRN: [id number]  YOB: 1950  Date of evaluation: 11/13/2024    Procedure Summary       Date: 11/13/24 Room / Location: [address]    Anesthesia Start: 1043 Anesthesia Stop: 1251    Procedure: ROBOTIC ASSISTED LEFT KNEE TOTAL ARTHROPLASTY -ADDUCTOR BLOCK; WESTON (Left: Knee) Diagnosis:       Primary osteoarthritis of left knee      (Primary osteoarthritis of left knee [M17.12])    Surgeons: Nixon Maya MD Responsible Provider: Hugh Velazquez MD    Anesthesia Type: general ASA Status: 2            Anesthesia Type: No value filed.    Asael Phase I: Asael Score: 10    Asael Phase II: Asael Score: 10    Anesthesia Post Evaluation    Patient location during evaluation: PACU  Patient participation: complete - patient participated  Level of consciousness: awake  Airway patency: patent  Nausea & Vomiting: no nausea and no vomiting  Cardiovascular status: blood pressure returned to baseline and hemodynamically stable  Respiratory status: acceptable  Hydration status: euvolemic  Multimodal analgesia pain management approach  Pain management: adequate    No notable events documented.

## 2024-11-13 NOTE — DISCHARGE INSTRUCTIONS
Additional contraception should be used for 7 days for sugammadex and/or 28 days for emend. These medications have a potential to reduce the effectiveness of hormonal birth control.

## 2024-11-13 NOTE — INTERVAL H&P NOTE
Patient seen and examined.  I have reviewed the history and physical and examined the patient and find no relevant changes.  Surgery plan reviewed.    Site marked and consent verified.  All questions answered and antibiotic verified.    Ready for left total knee arthroplasty with robotic assistance.          Nixon Maya MD, FAAOS  Board Certified Orthopaedic Surgeon  [facility]  [place] & [place]    Phone:[phone]  Fax [phone]    11/13/24  9:46 AM

## 2024-11-13 NOTE — PROGRESS NOTES
DATE 11/13___      PLACE __x_ [address]                          TIME _1145__                                             ___ [address]                           Arrival 0945___                                                                                                                                                                                                        Surgeons office to give arrival time _____                                                                                                 If you have not received time contact your surgeon.                                                                                                                              Surgery dates,times and arrival times are subject to change.Please check with your surgeon.  Bring a photo I.D.,insurance card and form of payment if you have a co pay.  Plan for someone 18 years or older to stay on site while you are her and to take you home after surgery.You are not permitted to drive yourself home. You cannot leave via Uber, Lyft, Taxi or Medical Transport by yourself. You must have someone with you. It is strongly suggested that someone stay with you the first 24 hours after anesthesia. Your surgery will be cancelled if you do not have a ride home. A parent or legal guardian guardian must stay with a child until the child is discharged. Please do not bring other children.  A History/Physical is required to be completed and dated within 30 days of your surgery. To be done by PCP  to make appt__ Surgeon ___or Hospitalist ___  You may be required to get labs _x_ EKG __ or a chest Xray _x__ prior to surgery. To be done by __11/4__  Nothing to eat or drink after midnight the evening prior to surgery.  If your surgeon requires a bowel prep, follow their instructions.  You may brush your teeth.  Bring a complete list of your medications including vitamins and supplement with the name,dose and frequency.  Take the 
 [facility] Orthopedic Surgery   Progress Note    CHIEF COMPLAINT/DIAGNOSIS: S/p left Total Knee Arthroplasty    SUBJECTIVE: The patient is seen sitting up in the bed; describes no pain - no paresthesias. She denies other issues. Has RW.  Family at bedside.     OBJECTIVE  Physical    VITALS:  /67   Pulse 63   Temp 98.6 °F (37 °C) (Oral)   Resp 11   Ht 1.524 m (5')   Wt 78.9 kg (174 lb)   SpO2 99%   BMI 33.98 kg/m²     GENERAL: Alert and oriented x3, in no acute distress.    MUSCULOSKELETAL: Able to dorsi and plantarflex the ankle without issue.  INCISION:  Covered with post-op dressing/ACE is c/d/I.  ROM: left knee ROM deferred.  Sensory:  Intact to light touch in peroneal and tibial distributions.   Vascular:   2+ DP pulses with brisk cap refill;  calf soft and nontender    Data    ALL MEDICATIONS HAVE BEEN REVIEWED    CBC: No results for input(s): \"WBC\", \"HGB\", \"HCT\", \"PLT\" in the last 72 hours.  BMP: No results for input(s): \"NA\", \"K\", \"CL\", \"CO2\", \"PHOS\", \"BUN\", \"CREATININE\" in the last 72 hours.    Invalid input(s): \"CA\"  INR: No results for input(s): \"INR\" in the last 72 hours.    Post-op films pending     ASSESSMENT:  S/p left Total Knee Arthroplasty (11/13/24), POD#0  Insomnia  HLD  GERD    PLAN:   - WB status:  WBAT; reviewed post op precautions  - DVT prophylaxis: ASA 81mg BID x 30 days    - PT/OT  - Pain Control: Current regimen. Due to orthopaedic surgical procedure/condition, patient may require pain medication for up to 6-8 weeks.  - ID:  Ancef x 1 doses post-op .  - Dispo: home with home therapy today    Follow-up with Dr. Maya in two weeks.  [phone]  Future Appointments   Date Time Provider Department Center   11/13/2024  1:15 PM F [facility]ABLE [facility] VICTOR MANUEL Valencia - CNP  11/13/2024  1:12 PM    
Assumed patient care, currently in phase 2. Patient is alert, oriented and currently tolerating ice chips. Dressing intact to Left knee, icepack in place. Pain rated at 2/10 currently after receiving medication in PACU. She voiced satisfaction with level. Offered snack which she declined at this time. Xray completed prior to arrival, message sent to therapy to notify of readiness for their evaluation. Prescriptions sent to pharmacy. VSS. Rails up, call light in reach, daughter at bedside.   
Confirmed patient received language preferred educational packet from the surgeons office and completed the review. Barriers to learning addressed. Patient education material mailed if requested in the language preferred. Reinforced with patient the importance of reading the entire packet of total joint replacement information and educational material received from their surgeon.  Encouraged to call their surgeons  office for questions or if they did not receive the packet. All patient  questions answered. Patient voices understanding.   
Nerve block completed, pt tolerated well.   
Nursing care provided to prep patient for surgery.  Preop orders completed.  Verified patient has completed 5 days of G preop showers that includes the day of surgery.  Pneumatic sleeves and compression stockings applied as ordered.  Teaching / education initiated regarding perioperative experience, postop expectations, plan of care, and pain management during hospital stay.  Patient verbalized understanding.  Pain goal expectations, care plan and education has been reviewed and mutually agreed upon with the patient.   
Patient to PACU from OR. Very drowsy. Oral airway in place. VSS. Surgical dressing to left leg c/d/I. + 2 bilat pedal pulses. Will monitor.   
Post evaluation, therapy recommends home PT, no current orders. Therapy reached out to Dwight LOZOYA directly to discuss, Aidan stated he will contact homecare and have them contact the patient this evening to schedule.   
Second dose of IV antibiotics completed. Reviewed discharge instructions with patient and daughter to include multiple new prescriptions. She remains satisfied with current pain level at 2/10 post therapy. Refreshed ice pack for home use. Also reiterated that home care team should be calling them tonight, if they do not, she was instructed to contact Dr. Maya's office in the morning for assistance. She voiced that she has a functional walker for her use at home. Patient and daughter verbalize understanding and deny any questions or concerns. To be taken to lobby via wheelchair with belongings, daughter to  prescriptions from pharmacy.   
Therapy here to work with patient  
assistance  Bed to chair transfer: contact guard assistance  Comments:  Gait belt donned.  CGA w/ RW for balance.  Ambulation:  Surface:level surface  Assistive Device: rolling walker  Assistance: contact guard assistance  Distance: 100'  Gait Mechanics: dec'd per, very mild antalgia, reciprocal, steady  Comments:    Stair Mobility:  Stair mobility not completed on this date.  Comments:  Patient has no stairs at home, declines need to attempt.  Wheelchair Mobility:  No w/c mobility completed on this date.  Comments:  Balance:  Static Sitting Balance: good: independent with functional balance in unsupported position  Dynamic Sitting Balance: fair (+): maintains balance at SBA/supervision without use of UE support  Static Standing Balance: fair (-): maintains balance at CGA with use of UE support  Dynamic Standing Balance: fair (-): maintains balance at CGA with use of UE support  Comments:    Other Therapeutic Interventions    Functional Outcomes  -PAC Inpatient Mobility Raw Score : 18              Cognition  WFL  Orientation:    alert and oriented x 4  Command Following:   WFL    Education  Barriers To Learning: none  Patient Education: patient educated on goals, PT role and benefits, plan of care, weight-bearing education, HEP, general safety, functional mobility training, proper use of assistive device/equipment, transfer training, discharge recommendations  Learning Assessment:  patient verbalizes and demonstrates understanding    Assessment  Activity Tolerance: Limited by ROM and strength deficits s/p L TKA.  Impairments Requiring Therapeutic Intervention: decreased functional mobility, decreased ADL status, decreased ROM, decreased strength, decreased endurance, decreased balance, increased pain  Prognosis: good  Clinical Assessment: Patient reports being functionally independent without a device prior to admission.  Presently, she is s/p L TKA POD #0.  She is able to perform all transfers and ambulation 
38 therapy units split with PT d/t observation status.        Electronically Signed By: Zoie Mora, OTR/L [id number]

## 2024-11-13 NOTE — BRIEF OP NOTE
Brief Postoperative Note      Patient: Barrett Mendez  YOB: 1950  MRN: [id number]    Date of Procedure: 11/13/2024    Pre-Op Diagnosis Codes:      * Primary osteoarthritis of left knee [M17.12]    Post-Op Diagnosis: Same       Procedure(s):  ROBOTIC ASSISTED LEFT KNEE TOTAL ARTHROPLASTY -ADDUCTOR BLOCK; WESTON    Surgeon(s):  Bob Tay MD    Assistant:  Surgical Assistant: Kalia Powell  Physician Assistant: Aidan Caba APRN - CNP    Anesthesia: General    Estimated Blood Loss (mL): less than 100     Complications: None    Specimens:   * No specimens in log *    Implants:  Implant Name Type Inv. Item Serial No.  Lot No. LRB No. Used Action   CEMENT BNE 40GM HI VISC RADPQ FOR REV SURG - [id number]  CEMENT BNE 40GM HI VISC RADPQ FOR REV SURG  [employer]WD [id number] Left 1 Implanted   CEMENT BNE 40GM HI VISC RADPQ FOR REV SURG - [id number]  CEMENT BNE 40GM HI VISC RADPQ FOR REV SURG  [employer] [id number] Left 1 Implanted   DUP USE [id number] IMPL KNEE PSN TIB STM 5 DEG SZ D L - [id number] Knee DUP USE [id number] IMPL KNEE PSN TIB STM 5 DEG SZ D L  [employer] [id number] Left 1 Implanted   IMPL KNEE FEM COMP PSN SZ 5 L - [id number] Knee IMPL KNEE FEM COMP PSN SZ 5 L  [employer]PMM [id number] Left 1 Implanted   COMPONENT PAT [id number] THK8.5MM STD KNEE VIVACIT-E DENNY - [id number]  COMPONENT PAT [id number] THK8.5MM STD KNEE VIVACIT-E DENNY  [employer]WD [id number] Left 1 Implanted         Drains: * No LDAs found *    Findings:  Infection Present At Time Of Surgery (PATOS) (choose all levels that have infection present):  No infection present  Other Findings: left knee arthritis    Electronically signed by BOB TAY MD on 11/13/2024 at 12:18 PM

## 2024-11-14 ENCOUNTER — TELEPHONE (OUTPATIENT)
Dept: ORTHOPEDIC SURGERY | Age: 74
End: 2024-11-14

## 2024-11-14 LAB
BLOOD BANK DISPENSE STATUS: NORMAL
BLOOD BANK DISPENSE STATUS: NORMAL
BLOOD BANK PRODUCT CODE: NORMAL
BLOOD BANK PRODUCT CODE: NORMAL
BPU ID: NORMAL
BPU ID: NORMAL
DESCRIPTION BLOOD BANK: NORMAL
DESCRIPTION BLOOD BANK: NORMAL

## 2024-11-14 NOTE — OP NOTE
Operative Note      Patient: Barrett Mendez  YOB: 1950  MRN: [id number]    Date of Procedure: 11/13/2024    Pre-Op Diagnosis Codes:      * Primary osteoarthritis of left knee [M17.12]    Post-Op Diagnosis: Same       Procedure(s):  ROBOTIC ASSISTED LEFT KNEE TOTAL ARTHROPLASTY -ADDUCTOR BLOCK; WESTON    Surgeon(s):  Nixon Maya MD    Assistant:   Surgical Assistant: Kalia Powell  Physician Assistant: Aidan Caba APRN - CNP    Anesthesia: General    Estimated Blood Loss (mL): less than 100     Complications: None    Specimens:   * No specimens in log *    Implants:  Implant Name Type Inv. Item Serial No.  Lot No. LRB No. Used Action   CEMENT BNE 40GM HI VISC RADPQ FOR REV SURG - [id number]  CEMENT BNE 40GM HI VISC RADPQ FOR REV SURG  [employer]- [id number] Left 1 Implanted   CEMENT BNE 40GM HI VISC RADPQ FOR REV SURG - [id number]  CEMENT BNE 40GM HI VISC RADPQ FOR REV SURG  [employer]- [id number] Left 1 Implanted   DUP USE [id number] IMPL KNEE PSN TIB STM 5 DEG SZ D L - [id number] Knee DUP USE [id number] IMPL KNEE PSN TIB STM 5 DEG SZ D L  [employer] [id number] Left 1 Implanted   IMPL KNEE FEM COMP PSN SZ 5 L - [id number] Knee IMPL KNEE FEM COMP PSN SZ 5 L  [employer] [id number] Left 1 Implanted   COMPONENT PAT [id number] THK8.5MM STD KNEE VIVACIT-E DENNY - [id number]  COMPONENT PAT [id number] THK8.5MM STD KNEE VIVACIT-E DENNY  [employer]- [id number] Left 1 Implanted   PSN MC VE ASF L 12MM 4-5/CD - [id number]  PSN MC VE ASF L 12MM 4-5/CD  [employer]- [id number] Left 1 Implanted         Drains: * No LDAs found *    Findings:  Infection Present At Time Of Surgery (PATOS) (choose all levels that have infection present):  No infection present  Other Findings: arthritic changes left knee    Indications:   The patient has been battling left knee pain for months to years.  Pain has gotten worse recently.  Patient has

## 2024-11-14 NOTE — TELEPHONE ENCOUNTER
Called patient. Order for home physical therapy was just placed about 15 minutes ago. Told her they should be calling today and if they did not to let us know.

## 2024-11-14 NOTE — TELEPHONE ENCOUNTER
Medical Facility Question     Facility Name: [facility] [place]   Contact Name: ARIADNA  Contact Number: [phone] SECURE VM   Request or Information: [facility] ORDER

## 2024-11-14 NOTE — TELEPHONE ENCOUNTER
PT IS REQ CALL BACK FROM OFFICE, AND AWAITING CONTACT FROM IN HOME [facility]Y     PT CAN BE REACHED [phone]

## 2024-11-19 ENCOUNTER — TELEPHONE (OUTPATIENT)
Age: 74
End: 2024-11-19

## 2024-11-19 NOTE — TELEPHONE ENCOUNTER
Patient called and stated that she fell yesterday getting out of bed and she is post op from TKA Left. No seeping of incision. She can bend it just as much as she could prior to the fall. No increase in pain she is just achy. Scheduled her appointment with Dr. Maya for tomorrow when he is in clinic.

## 2024-11-20 ENCOUNTER — OFFICE VISIT (OUTPATIENT)
Age: 74
End: 2024-11-20

## 2024-11-20 VITALS — BODY MASS INDEX: 34.16 KG/M2 | WEIGHT: 174 LBS | HEIGHT: 60 IN

## 2024-11-20 DIAGNOSIS — Z91.81 HISTORY OF RECENT FALL: ICD-10-CM

## 2024-11-20 DIAGNOSIS — Z96.652 S/P TOTAL KNEE ARTHROPLASTY, LEFT: Primary | ICD-10-CM

## 2024-11-26 ENCOUNTER — OFFICE VISIT (OUTPATIENT)
Age: 74
End: 2024-11-26

## 2024-11-26 VITALS — WEIGHT: 174 LBS | BODY MASS INDEX: 34.16 KG/M2 | HEIGHT: 60 IN

## 2024-11-26 DIAGNOSIS — Z96.652 S/P TOTAL KNEE ARTHROPLASTY, LEFT: Primary | ICD-10-CM

## 2024-11-26 NOTE — PROGRESS NOTES
Patient Name: Barrett Mendez  Medical Record Number: [id number]  YOB: 1950  Date of Encounter: 11/26/2024     Chief Complaint   Patient presents with    Post-Op Check     Left knee DOS 11/13/24 in physical therapy       Total Knee Follow-up  Barrett Mendez is here for 2 weeks post left total knee arthroplasty follow-up. Pain is controlled with ibuprofen. The patient denies fever, wound drainage, increasing redness, pus, increasing pain, increasing swelling. Post op problems reported: none. She is ambulating with her rolling walker today.     She is working with home physical therapy and is ready to start outpatient PT.    Patient is still taking Vitamin D supplementation. DVT prophylaxis is  mg.     The patient's  past medical history, medications, allergies,  family history, social history, and review of systems have been reviewed, and dated and are recorded in the chart under the 'MEDIA\" tab.    Physical Exam:   Ms. Barrett Mendez appears well, she is in no apparent distress, she demonstrates appropriate mood & affect. She is alert and oriented to person, place and time.    Ht 1.524 m (5')   Wt 78.9 kg (174 lb) Comment: Patient reported weight  BMI 33.98 kg/m²     Left Knee: She has moderate swelling which is resolving as expected. The incision is clean, dry, intact and nontender and without erythema or induration. Range of motion from 5 to 90 degrees. She has mild pain with range of motion of the knee. Patient has 4-/5 motor strength with flexion and extension of the left knee. She is able to do straight leg raises without difficulty. There is mild left lower extremity edema. She is neurovascularly intact distally.     Radiology:  X-rays obtained and reviewed in office:  Views: 3 views left knee.  Impression: Stable total knee arthroplasty with satisfactory alignment. There is no evidence of loosening or subsidence.    Orders:  Orders Placed This Encounter   Procedures    XR KNEE LEFT (3

## 2024-12-04 ENCOUNTER — HOSPITAL ENCOUNTER (OUTPATIENT)
Dept: PHYSICAL THERAPY | Age: 74
Setting detail: THERAPIES SERIES
Discharge: HOME | End: 2024-12-04
Payer: MEDICARE

## 2024-12-04 ENCOUNTER — TELEPHONE (OUTPATIENT)
Dept: ORTHOPEDIC SURGERY | Age: 74
End: 2024-12-04

## 2024-12-04 DIAGNOSIS — M25.562 LEFT KNEE PAIN, UNSPECIFIED CHRONICITY: Primary | ICD-10-CM

## 2024-12-04 DIAGNOSIS — M25.662 DECREASED RANGE OF MOTION OF LEFT KNEE: ICD-10-CM

## 2024-12-04 DIAGNOSIS — M25.462 EFFUSION OF LEFT KNEE: ICD-10-CM

## 2024-12-04 DIAGNOSIS — M62.81 QUADRICEPS WEAKNESS: ICD-10-CM

## 2024-12-04 PROCEDURE — 97110 THERAPEUTIC EXERCISES: CPT | Performed by: PHYSICAL THERAPIST

## 2024-12-04 PROCEDURE — 97161 PT EVAL LOW COMPLEX 20 MIN: CPT | Performed by: PHYSICAL THERAPIST

## 2024-12-04 NOTE — TELEPHONE ENCOUNTER
Called patient and let her know that she needs to get authorization from physical therapy. She will call them. No further action required.

## 2024-12-04 NOTE — PLAN OF CARE
[facility] [address], [place] [address] office: [phone] fax: [phone]     Physical Therapy Initial Evaluation Certification      Dear Nixon Maya MD ,    We had the pleasure of evaluating the following patient for physical therapy services at [facility].  A summary of our findings can be found in the initial assessment below.  This includes our plan of care.  If you have any questions or concerns regarding these findings, please do not hesitate to contact me at the office phone number listed above.  Thank you for the referral.     Physician Signature:_______________________________Date:__________________  By signing above (or electronic signature), therapist’s plan is approved by physician       Physical Therapy: TREATMENT/PROGRESS NOTE   Patient: Barrett Mendez (74 y.o. female)   Examination Date: 2024   :  1950 MRN: [id number]   Visit #: 1   Insurance Allowable Auth Needed   auth []Yes    []No    Insurance: Payor: [employer] / Plan: [employer] / Product Type: *No Product type* /   Insurance ID: [id number] - (Medicare Managed)  Secondary Insurance (if applicable):    Treatment Diagnosis:     ICD-10-CM    1. Left knee pain, unspecified chronicity  M25.562       2. Quadriceps weakness  M62.81       3. Effusion of left knee  M25.462       4. Decreased range of motion of left knee  M25.662          Medical Diagnosis:  S/P total knee arthroplasty, left [Z96.652]   Referring Physician: Nixon Maya, *  PCP: David Zayas MD     Plan of care signed (Y/N):     Date of Patient follow up with Physician:      Plan of Care Report: EVAL today  POC update due: (10 visits /OR AUTH LIMITS, whichever is less)  1/3/2025                                             Medical History:  Comorbidities:  Osteoarthritis  Relevant Medical History:

## 2024-12-04 NOTE — TELEPHONE ENCOUNTER
General Question     Subject: LT KNEE PHYSICAL THERAPY APPROVAL   Patient and /or Facility Request: Barrett Mendez \"Audrey\"   Contact Number: [phone]     PATIENT CALLED IN TO SEE IF SHE CAN GET AN CALL BACK REGARDING GETTING APPROVAL THROUGH THE INSURANCE FOR HER PHYSICAL THERAPY.     REFERRAL TO [facility]...    PLEASE ADVISE

## 2024-12-06 ENCOUNTER — HOSPITAL ENCOUNTER (OUTPATIENT)
Dept: PHYSICAL THERAPY | Age: 74
Setting detail: THERAPIES SERIES
Discharge: HOME | End: 2024-12-06
Payer: MEDICARE

## 2024-12-06 PROCEDURE — 97110 THERAPEUTIC EXERCISES: CPT | Performed by: PHYSICAL THERAPIST

## 2024-12-06 PROCEDURE — 97016 VASOPNEUMATIC DEVICE THERAPY: CPT | Performed by: PHYSICAL THERAPIST

## 2024-12-06 PROCEDURE — 97140 MANUAL THERAPY 1/> REGIONS: CPT | Performed by: PHYSICAL THERAPIST

## 2024-12-06 NOTE — FLOWSHEET NOTE
Term Goals: To be achieved in: 2 weeks  1. Independent in HEP and progression per patient tolerance, in order to prevent re-injury.   [] Progressing: [] Met: [] Not Met: [] Adjusted  2. Patient will have a decrease in pain to <3/10 to facilitate improvement in movement, function, and ADLs as indicated by Functional Deficits.  [] Progressing: [] Met: [] Not Met: [] Adjusted    IF APPLICABLE:  [] Patient to demonstrate independence in wear and care for custom orthotic device. (Only if applicable for orthotic eval)     Long Term Goals: To be achieved in: 12 weeks  1. Disability index score of 30% or less for the WOMAC to assist with reaching prior level of function with activities such as sleeping.  [] Progressing: [] Met: [] Not Met: [] Adjusted  2. Patient will demonstrate increased AROM of L knee extension/flexion to 0-125 without pain to allow for proper joint functioning to enable patient to perform sit to stand transitions.   [] Progressing: [] Met: [] Not Met: [] Adjusted  3. Patient will demonstrate increased Strength of L quad to at least 4+/5 throughout without pain to allow for proper functional mobility to enable patient to return to household management tasks.   [] Progressing: [] Met: [] Not Met: [] Adjusted  4. Patient will return to performing reciprocal stairs without increased symptoms or restriction.   [] Progressing: [] Met: [] Not Met: [] Adjusted  5. Patient will return to [employer] without increased restrictions  [] Progressing: [] Met: [] Not Met: [] Adjusted   6. Patient will demonstrate decreased edema of L knee to within 0.5cm of R to allow for proper functional mobility and muscle recruitment to enable patient to return to transitional movements without issue.   [] Progressing: [] Met: [] Not Met: [] Adjusted       Overall Progression Towards Functional goals/ Treatment Progress Update:  [] Patient is progressing as expected towards functional goals listed.    [] Progression is slowed due to

## 2024-12-09 ENCOUNTER — APPOINTMENT (OUTPATIENT)
Dept: PHYSICAL THERAPY | Age: 74
End: 2024-12-09
Payer: MEDICARE

## 2024-12-09 ENCOUNTER — OFFICE VISIT (OUTPATIENT)
Dept: FAMILY MEDICINE CLINIC | Age: 74
End: 2024-12-09
Payer: MEDICARE

## 2024-12-09 VITALS
WEIGHT: 175 LBS | DIASTOLIC BLOOD PRESSURE: 62 MMHG | HEIGHT: 60 IN | HEART RATE: 78 BPM | OXYGEN SATURATION: 96 % | BODY MASS INDEX: 34.36 KG/M2 | TEMPERATURE: 97.2 F | SYSTOLIC BLOOD PRESSURE: 134 MMHG

## 2024-12-09 DIAGNOSIS — R05.9 COUGH, UNSPECIFIED TYPE: ICD-10-CM

## 2024-12-09 DIAGNOSIS — B34.9 VIRAL SYNDROME: Primary | ICD-10-CM

## 2024-12-09 LAB
FLUAV + FLUBV AG NOSE IA.RAPID: NOT DETECTED
FLUAV + FLUBV AG NOSE IA.RAPID: NOT DETECTED
S PYO AG THROAT QL: NORMAL

## 2024-12-09 PROCEDURE — 99213 OFFICE O/P EST LOW 20 MIN: CPT | Performed by: FAMILY MEDICINE

## 2024-12-09 PROCEDURE — 87880 STREP A ASSAY W/OPTIC: CPT | Performed by: FAMILY MEDICINE

## 2024-12-09 PROCEDURE — 1123F ACP DISCUSS/DSCN MKR DOCD: CPT | Performed by: FAMILY MEDICINE

## 2024-12-09 NOTE — PROGRESS NOTES
Subjective   History was provided by the patient.    Barrett Mendez is a 74 y.o. female who presents for evaluation of symptoms of a URI. Symptoms include nasal congestion, sore throat, non-productive cough, and chills. Onset of symptoms was 3 days ago, and is unchanged since that time.  Patient denies ill exposure.  Denies shortness of breath or wheezing.  Evaluation to date: none. Treatment to date: Cough drops.  No known ill exposures.       Objective   Physical Exam   Physical Exam  General: alert, appears stated age, and cooperative  Ear exam: normal TM's and external ear canals both ears  Sinus exam: Normal paranasal sinuses without tenderness  Oropharynx exam: lips, mucosa, and tongue normal; teeth and gums normal  Neck exam: no adenopathy, supple, symmetrical, trachea midline, and thyroid not enlarged, symmetric, no tenderness/mass/nodules  Heart exam: normal rate and regular rhythm  Lung exam: clear to auscultation bilaterally     Rapid strep-negative     Assessment   viral upper respiratory illness and viral pharyngitis      Plan   Supportive care with appropriate antipyretics and fluids.  Patient declines prescription cough medication and reports that she is doing well with cough drops  COVID-19 and flu cultures are pending  Reevaluate if symptoms persist or worsen

## 2024-12-10 LAB — SARS-COV-2 N GENE RESP QL NAA+PROBE: NOT DETECTED

## 2024-12-12 ENCOUNTER — APPOINTMENT (OUTPATIENT)
Dept: PHYSICAL THERAPY | Age: 74
End: 2024-12-12
Payer: MEDICARE

## 2024-12-16 ENCOUNTER — APPOINTMENT (OUTPATIENT)
Dept: PHYSICAL THERAPY | Age: 74
End: 2024-12-16
Payer: MEDICARE

## 2024-12-19 ENCOUNTER — APPOINTMENT (OUTPATIENT)
Dept: PHYSICAL THERAPY | Age: 74
End: 2024-12-19
Payer: MEDICARE

## 2024-12-23 ENCOUNTER — APPOINTMENT (OUTPATIENT)
Dept: PHYSICAL THERAPY | Age: 74
End: 2024-12-23
Payer: MEDICARE

## 2024-12-26 ENCOUNTER — APPOINTMENT (OUTPATIENT)
Dept: PHYSICAL THERAPY | Age: 74
End: 2024-12-26
Payer: MEDICARE

## 2024-12-26 RX ORDER — LOSARTAN POTASSIUM 25 MG/1
25 TABLET ORAL DAILY
Qty: 30 TABLET | Refills: 2 | OUTPATIENT
Start: 2024-12-26

## 2024-12-30 ENCOUNTER — APPOINTMENT (OUTPATIENT)
Dept: PHYSICAL THERAPY | Age: 74
End: 2024-12-30
Payer: MEDICARE

## 2025-01-02 NOTE — TELEPHONE ENCOUNTER
LRX: 9/30/2024  LOV: 12/9/2024   NOV: Visit date not found   Last lab:     Sent link on my chart to schedule a follow up appt.

## 2025-01-03 RX ORDER — LOSARTAN POTASSIUM 25 MG/1
25 TABLET ORAL DAILY
Qty: 30 TABLET | Refills: 2 | OUTPATIENT
Start: 2025-01-03

## 2025-01-03 NOTE — TELEPHONE ENCOUNTER
Spoke with pt and advised of PCP note. Pt verbalized understanding.     Pt states that she will not make an appt because she was here last month but she was here for \"acute visit\" I tried telling her but she won't come in for her meds refill

## 2025-01-03 NOTE — TELEPHONE ENCOUNTER
Patient is due for medication checkup.  She was here for an acute visit recently but not in medication follow-up.  Please make an appointment for her and we can give her partial refill to get her to her appointment.

## 2025-01-15 ENCOUNTER — OFFICE VISIT (OUTPATIENT)
Age: 75
End: 2025-01-15

## 2025-01-15 VITALS — HEIGHT: 60 IN | BODY MASS INDEX: 33.18 KG/M2 | WEIGHT: 169 LBS

## 2025-01-15 DIAGNOSIS — M79.2 NEURALGIA: Primary | ICD-10-CM

## 2025-01-15 DIAGNOSIS — Z96.652 S/P TOTAL KNEE ARTHROPLASTY, LEFT: ICD-10-CM

## 2025-01-15 DIAGNOSIS — M76.32 ILIOTIBIAL BAND SYNDROME AFFECTING LOWER LEG, LEFT: ICD-10-CM

## 2025-01-15 PROCEDURE — 99024 POSTOP FOLLOW-UP VISIT: CPT | Performed by: ORTHOPAEDIC SURGERY

## 2025-01-15 NOTE — PROGRESS NOTES
Neuralgia Yes    S/P total knee arthroplasty, left     Iliotibial band syndrome affecting lower leg, left        Treatment Plan:   We discussed treatment options.         She seems to have developed some neuralgia slice hypersensitivity likely due to lack of functional activities most of the month of December.  She is slated to resume physical therapy next week.  She has tried multiple over-the-counter creams to help with pain relief.  We discussed utilizing a compounded medication from [facility] and a prescription was sent today.  This may help reduce overall pain and irritation.  She may be developing some tightness in her iliotibial band and I believe physical therapy will help with this.  She does have reasonable range of motion but can definitely improve her flexion maybe 10 more degrees with continued work.  She does not show any outward signs of infection at this point.  Will monitor her progress she will complete her planned physical therapy.  If she feels like she still struggling I would encourage her to follow-up in another 4 weeks for recheck.    Barrett Mendez understands and accepts this course of care

## 2025-01-22 ENCOUNTER — HOSPITAL ENCOUNTER (OUTPATIENT)
Dept: PHYSICAL THERAPY | Age: 75
Setting detail: THERAPIES SERIES
Discharge: HOME | End: 2025-01-22
Payer: COMMERCIAL

## 2025-01-22 ENCOUNTER — APPOINTMENT (OUTPATIENT)
Dept: PHYSICAL THERAPY | Age: 75
End: 2025-01-22
Payer: COMMERCIAL

## 2025-01-22 PROCEDURE — 97110 THERAPEUTIC EXERCISES: CPT | Performed by: PHYSICAL THERAPIST

## 2025-01-22 PROCEDURE — 97530 THERAPEUTIC ACTIVITIES: CPT | Performed by: PHYSICAL THERAPIST

## 2025-01-22 PROCEDURE — 97140 MANUAL THERAPY 1/> REGIONS: CPT | Performed by: PHYSICAL THERAPIST

## 2025-01-22 NOTE — PLAN OF CARE
[facility] [address], [place] [address] office: [phone] fax: [phone]     Physical Therapy Re-Certification Plan of Care    Dear Nixon Maya MD  ,    We had the pleasure of treating the following patient for physical therapy services at [facility]. A summary of our findings can be found in the updated assessment below.  This includes our plan of care.  If you have any questions or concerns regarding these findings, please do not hesitate to contact me at the office phone number checked above.  Thank you for the referral.     Physician Signature:________________________________Date:__________________  By signing above (or electronic signature), therapist's plan is approved by physician      Total Visits: 3     Overall Response to Treatment:  Pt. Returns to therapy after prolonged absence from being sick. Pt. Is able to ambulate without AD but continues to have pain with transitional movements. Deficits in knee flexion ROM persisting. Pt. Will benefit from skilled therapy in order to restore functional strength and motion for decreased pain with transitional movements.     Recommendation:    [x] Continue PT 1-2x / wk for 8 weeks.   [] Hold PT, pending MD visit   [] Discharge to [facility]. Follow up with PT or MD PRN.       Physical Therapy: TREATMENT/PROGRESS NOTE   Patient: Barrett Mendez (74 y.o. female)   Examination Date: 2025   :  1950 MRN: [id number]   Visit #: 3  (2 in )  Insurance Allowable Auth Needed   [place] []Yes    []No    Insurance: Payor: [employer] / Plan: [employer] / Product Type: *No Product type* /   Insurance ID: [id number] - (Medicare Managed)  Secondary Insurance (if applicable):    Treatment Diagnosis:     ICD-10-CM    1. Left knee pain, unspecified chronicity  M25.562       2. Quadriceps weakness  M62.81       3. Effusion of left knee

## 2025-01-27 ENCOUNTER — HOSPITAL ENCOUNTER (OUTPATIENT)
Dept: PHYSICAL THERAPY | Age: 75
Setting detail: THERAPIES SERIES
Discharge: HOME | End: 2025-01-27
Payer: COMMERCIAL

## 2025-01-27 PROCEDURE — 97530 THERAPEUTIC ACTIVITIES: CPT | Performed by: PHYSICAL THERAPIST

## 2025-01-27 PROCEDURE — 97110 THERAPEUTIC EXERCISES: CPT | Performed by: PHYSICAL THERAPIST

## 2025-01-27 PROCEDURE — 97140 MANUAL THERAPY 1/> REGIONS: CPT | Performed by: PHYSICAL THERAPIST

## 2025-01-27 NOTE — FLOWSHEET NOTE
[facility] [address], [place] [address] office: [phone] fax: [phone]         Physical Therapy: TREATMENT/PROGRESS NOTE   Patient: Barrett Mendez (74 y.o. female)   Examination Date: 2025   :  1950 MRN: [id number]   Visit #: 4  (2 in )  Insurance Allowable Auth Needed   [place] []Yes    []No    Insurance: Payor: [employer] / Plan: [employer] / Product Type: *No Product type* /   Insurance ID: [id number] - (Medicare Managed)  Secondary Insurance (if applicable):    Treatment Diagnosis:     ICD-10-CM    1. Left knee pain, unspecified chronicity  M25.562       2. Quadriceps weakness  M62.81       3. Effusion of left knee  M25.462       4. Decreased range of motion of left knee  M25.662          Medical Diagnosis:  S/P total knee arthroplasty, left [Z96.652]   Referring Physician: Nixon Maya, *  PCP: David Zayas MD     Plan of care signed (Y/N): Y    Date of Patient follow up with Physician:      Plan of Care Report: NO  POC update due: (10 visits /OR AUTH LIMITS, whichever is less)  2025                                             Medical History:  Comorbidities:  Osteoarthritis  Relevant Medical History:                                          Precautions/ Contra-indications:           Latex allergy:  NO  Pacemaker:    NO  Contraindications for Manipulation: recent surgical history (relative)  Date of Surgery: 24 L TKA  Other:    Red Flags:  None    Suicide Screening:   The patient did not verbalize a primary behavioral concern, suicidal ideation, suicidal intent, or demonstrate suicidal behaviors.    Preferred Language for Healthcare:   [x] English       [] other:    SUBJECTIVE EXAMINATION     Patient stated complaint: Pt. States that her knee is feeling a little better. She has less swelling this week. Pt. Reports that due to financial restrictions she will be limited in

## 2025-01-29 ENCOUNTER — APPOINTMENT (OUTPATIENT)
Dept: PHYSICAL THERAPY | Age: 75
End: 2025-01-29
Payer: COMMERCIAL

## (undated) DEVICE — SOLUTION WND IRRIGATION 450 ML 0.5 PVP-I 0.9 NACL

## (undated) DEVICE — TOTAL KNEE: Brand: MEDLINE INDUSTRIES, INC.

## (undated) DEVICE — APPLICATOR MEDICATED 26 CC SOLUTION HI LT ORNG CHLORAPREP

## (undated) DEVICE — ADHESIVE SKIN CLOSURE WND 8.661X1.5 IN 22 CM LIQUIBAND SECUR

## (undated) DEVICE — STERILE TOTAL KNEE DRAPE PACK: Brand: CARDINAL HEALTH

## (undated) DEVICE — HEADLESS TROCHAR PIN 75MM
Type: IMPLANTABLE DEVICE | Site: KNEE | Status: NON-FUNCTIONAL
Brand: ZUK
Removed: 2024-11-13

## (undated) DEVICE — 3D ISLAND DRESSING 4IN X 12IN: Brand: THERABOND 3D ANTIMICROBIAL BARRIER SYSTEMS

## (undated) DEVICE — Device

## (undated) DEVICE — HANDPIECE SET WITH HIGH FLOW TIP AND SUCTION TUBE: Brand: INTERPULSE

## (undated) DEVICE — GLOVE SURG SZ 8.5 L11.85IN FNGR THK9.8MIL STRW LTX POLYMER

## (undated) DEVICE — SUTURE VICRYL + SZ 2-0 L18IN ABSRB UD CT1 L36MM 1/2 CIR VCP839D

## (undated) DEVICE — STRYKER PERFORMANCE SERIES SAGITTAL BLADE: Brand: STRYKER PERFORMANCE SERIES

## (undated) DEVICE — SUTURE MONOCRYL + SZ 3-0 L27IN ABSRB UD PS1 L24MM 3/8 CIR REV MCP936H

## (undated) DEVICE — DEVICE POS W4INXL5YD KNEE SURG FOAM PD SELF ADH WRP DEMAYO

## (undated) DEVICE — HOOD: Brand: T7PLUS

## (undated) DEVICE — BANDAGE COMPR W6INXL10YD ST M E WHITE/BEIGE

## (undated) DEVICE — 2108 SERIES SAGITTAL BLADE FAN, OFFSET  (29.0 X 0.89 X 73.0MM)

## (undated) DEVICE — SUTURE VICRYL + SZ 0 L18IN ABSRB UD L36MM CT-1 1/2 CIR VCP840D

## (undated) DEVICE — YANKAUER,OPEN TIP,W/O VENT,STERILE: Brand: MEDLINE INDUSTRIES, INC.

## (undated) DEVICE — HOOD WITH PEEL AWAY FACE SHIELD: Brand: T7PLUS

## (undated) DEVICE — HYPODERMIC SAFETY NEEDLE: Brand: MAGELLAN

## (undated) DEVICE — Device: Brand: STABLECUT®

## (undated) DEVICE — SHEET,DRAPE,53X77,STERILE: Brand: MEDLINE

## (undated) DEVICE — SCREW BNE HD 3.5X48 MM HEX PERSONA (NOT IMPLANTED)

## (undated) DEVICE — SUTURE ABSORBABLE MONOFILAMENT 1 MO-4 36 CM 36 MM VIO PDS +

## (undated) DEVICE — GLOVE ORANGE PI 8 1/2   MSG9085